# Patient Record
Sex: MALE | Employment: FULL TIME | URBAN - METROPOLITAN AREA
[De-identification: names, ages, dates, MRNs, and addresses within clinical notes are randomized per-mention and may not be internally consistent; named-entity substitution may affect disease eponyms.]

---

## 2017-01-12 ENCOUNTER — APPOINTMENT (OUTPATIENT)
Dept: CT IMAGING | Facility: CLINIC | Age: 42
End: 2017-01-12
Attending: EMERGENCY MEDICINE

## 2017-01-12 ENCOUNTER — APPOINTMENT (OUTPATIENT)
Dept: MRI IMAGING | Facility: CLINIC | Age: 42
End: 2017-01-12
Attending: PHYSICIAN ASSISTANT

## 2017-01-12 ENCOUNTER — APPOINTMENT (OUTPATIENT)
Dept: MRI IMAGING | Facility: CLINIC | Age: 42
End: 2017-01-12
Attending: EMERGENCY MEDICINE

## 2017-01-12 ENCOUNTER — HOSPITAL ENCOUNTER (OUTPATIENT)
Facility: CLINIC | Age: 42
Setting detail: OBSERVATION
Discharge: HOME | End: 2017-01-13
Attending: EMERGENCY MEDICINE | Admitting: INTERNAL MEDICINE

## 2017-01-12 DIAGNOSIS — N18.1 TYPE 2 DIABETES MELLITUS WITH STAGE 1 CHRONIC KIDNEY DISEASE, WITH LONG-TERM CURRENT USE OF INSULIN (H): Primary | ICD-10-CM

## 2017-01-12 DIAGNOSIS — I10 BENIGN ESSENTIAL HYPERTENSION: ICD-10-CM

## 2017-01-12 DIAGNOSIS — R55 SYNCOPE, UNSPECIFIED SYNCOPE TYPE: ICD-10-CM

## 2017-01-12 DIAGNOSIS — E11.22 TYPE 2 DIABETES MELLITUS WITH STAGE 1 CHRONIC KIDNEY DISEASE, WITH LONG-TERM CURRENT USE OF INSULIN (H): Primary | ICD-10-CM

## 2017-01-12 DIAGNOSIS — G45.9 TRANSIENT CEREBRAL ISCHEMIA, UNSPECIFIED TYPE: ICD-10-CM

## 2017-01-12 DIAGNOSIS — R29.898 BILATERAL ARM WEAKNESS: ICD-10-CM

## 2017-01-12 DIAGNOSIS — R07.89 ATYPICAL CHEST PAIN: ICD-10-CM

## 2017-01-12 DIAGNOSIS — Z79.4 TYPE 2 DIABETES MELLITUS WITH STAGE 1 CHRONIC KIDNEY DISEASE, WITH LONG-TERM CURRENT USE OF INSULIN (H): Primary | ICD-10-CM

## 2017-01-12 DIAGNOSIS — R41.0 CONFUSION: ICD-10-CM

## 2017-01-12 DIAGNOSIS — R10.84 ABDOMINAL PAIN, GENERALIZED: ICD-10-CM

## 2017-01-12 DIAGNOSIS — E78.5 HYPERLIPIDEMIA LDL GOAL <100: ICD-10-CM

## 2017-01-12 LAB
ALBUMIN UR-MCNC: NEGATIVE MG/DL
AMMONIA PLAS-SCNC: 38 UMOL/L (ref 10–50)
AMPHETAMINES UR QL SCN: NORMAL
ANION GAP SERPL CALCULATED.3IONS-SCNC: 7 MMOL/L (ref 3–14)
APPEARANCE CSF: CLEAR
APPEARANCE UR: CLEAR
APTT PPP: 29 SEC (ref 22–37)
BILIRUB UR QL STRIP: NEGATIVE
BUN SERPL-MCNC: 17 MG/DL (ref 7–30)
CALCIUM SERPL-MCNC: 8.9 MG/DL (ref 8.5–10.1)
CANNABINOIDS UR QL: NORMAL
CHLORIDE SERPL-SCNC: 107 MMOL/L (ref 94–109)
CO2 SERPL-SCNC: 25 MMOL/L (ref 20–32)
COCAINE UR QL: NORMAL
COLOR CSF: COLORLESS
COLOR UR AUTO: ABNORMAL
CREAT BLD-MCNC: 0.7 MG/DL (ref 0.66–1.25)
CREAT SERPL-MCNC: 0.68 MG/DL (ref 0.66–1.25)
ERYTHROCYTE [DISTWIDTH] IN BLOOD BY AUTOMATED COUNT: 11.9 % (ref 10–15)
ETHANOL SERPL-MCNC: <0.01 G/DL
GFR SERPL CREATININE-BSD FRML MDRD: >90 ML/MIN/1.7M2
GFR SERPL CREATININE-BSD FRML MDRD: ABNORMAL ML/MIN/1.7M2
GLUCOSE BLDC GLUCOMTR-MCNC: 205 MG/DL (ref 70–99)
GLUCOSE BLDC GLUCOMTR-MCNC: 297 MG/DL (ref 70–99)
GLUCOSE CSF-MCNC: 140 MG/DL (ref 40–70)
GLUCOSE SERPL-MCNC: 268 MG/DL (ref 70–99)
GLUCOSE UR STRIP-MCNC: >1000 MG/DL
GRAM STN SPEC: NORMAL
HBA1C MFR BLD: 12 % (ref 4.3–6)
HCT VFR BLD AUTO: 43.6 % (ref 40–53)
HGB BLD-MCNC: 14.8 G/DL (ref 13.3–17.7)
HGB UR QL STRIP: NEGATIVE
INR PPP: 0.95 (ref 0.86–1.14)
INTERPRETATION ECG - MUSE: NORMAL
KETONES UR STRIP-MCNC: NEGATIVE MG/DL
LACTATE BLD-SCNC: 1.2 MMOL/L (ref 0.7–2.1)
LEUKOCYTE ESTERASE UR QL STRIP: NEGATIVE
MCH RBC QN AUTO: 28.9 PG (ref 26.5–33)
MCHC RBC AUTO-ENTMCNC: 33.9 G/DL (ref 31.5–36.5)
MCV RBC AUTO: 85 FL (ref 78–100)
MICRO REPORT STATUS: NORMAL
NITRATE UR QL: NEGATIVE
OPIATES UR QL SCN: NORMAL
PCP UR QL SCN: NORMAL
PH UR STRIP: 6.5 PH (ref 5–7)
PLATELET # BLD AUTO: 272 10E9/L (ref 150–450)
POTASSIUM SERPL-SCNC: 4.4 MMOL/L (ref 3.4–5.3)
PROT CSF-MCNC: 44 MG/DL (ref 15–60)
RBC # BLD AUTO: 5.12 10E12/L (ref 4.4–5.9)
RBC # CSF MANUAL: 0 /UL (ref 0–2)
RBC #/AREA URNS AUTO: 1 /HPF (ref 0–2)
SODIUM SERPL-SCNC: 139 MMOL/L (ref 133–144)
SP GR UR STRIP: 1.01 (ref 1–1.03)
SPECIMEN SOURCE: NORMAL
TROPONIN I BLD-MCNC: 0.02 UG/L (ref 0–0.1)
TROPONIN I SERPL-MCNC: NORMAL UG/L (ref 0–0.04)
TSH SERPL DL<=0.005 MIU/L-ACNC: 2.26 MU/L (ref 0.4–4)
TUBE # CSF: 4 #
URN SPEC COLLECT METH UR: ABNORMAL
UROBILINOGEN UR STRIP-MCNC: NORMAL MG/DL (ref 0–2)
WBC # BLD AUTO: 15.6 10E9/L (ref 4–11)
WBC # CSF MANUAL: 0 /UL (ref 0–5)
WBC #/AREA URNS AUTO: 0 /HPF (ref 0–2)

## 2017-01-12 PROCEDURE — 36415 COLL VENOUS BLD VENIPUNCTURE: CPT

## 2017-01-12 PROCEDURE — 82565 ASSAY OF CREATININE: CPT

## 2017-01-12 PROCEDURE — 00000146 ZZHCL STATISTIC GLUCOSE BY METER IP

## 2017-01-12 PROCEDURE — 89050 BODY FLUID CELL COUNT: CPT | Performed by: EMERGENCY MEDICINE

## 2017-01-12 PROCEDURE — 72125 CT NECK SPINE W/O DYE: CPT

## 2017-01-12 PROCEDURE — 87205 SMEAR GRAM STAIN: CPT | Performed by: EMERGENCY MEDICINE

## 2017-01-12 PROCEDURE — 96360 HYDRATION IV INFUSION INIT: CPT

## 2017-01-12 PROCEDURE — 84484 ASSAY OF TROPONIN QUANT: CPT | Performed by: EMERGENCY MEDICINE

## 2017-01-12 PROCEDURE — 87040 BLOOD CULTURE FOR BACTERIA: CPT | Performed by: EMERGENCY MEDICINE

## 2017-01-12 PROCEDURE — 96361 HYDRATE IV INFUSION ADD-ON: CPT

## 2017-01-12 PROCEDURE — 25000132 ZZH RX MED GY IP 250 OP 250 PS 637: Performed by: PHYSICIAN ASSISTANT

## 2017-01-12 PROCEDURE — 62270 DX LMBR SPI PNXR: CPT

## 2017-01-12 PROCEDURE — 81001 URINALYSIS AUTO W/SCOPE: CPT | Performed by: EMERGENCY MEDICINE

## 2017-01-12 PROCEDURE — 72128 CT CHEST SPINE W/O DYE: CPT

## 2017-01-12 PROCEDURE — 70544 MR ANGIOGRAPHY HEAD W/O DYE: CPT | Mod: XS

## 2017-01-12 PROCEDURE — 84157 ASSAY OF PROTEIN OTHER: CPT | Performed by: EMERGENCY MEDICINE

## 2017-01-12 PROCEDURE — 87800 DETECT AGNT MULT DNA DIREC: CPT | Performed by: EMERGENCY MEDICINE

## 2017-01-12 PROCEDURE — 84443 ASSAY THYROID STIM HORMONE: CPT | Performed by: EMERGENCY MEDICINE

## 2017-01-12 PROCEDURE — 71260 CT THORAX DX C+: CPT

## 2017-01-12 PROCEDURE — 80320 DRUG SCREEN QUANTALCOHOLS: CPT | Performed by: EMERGENCY MEDICINE

## 2017-01-12 PROCEDURE — 25000128 H RX IP 250 OP 636: Performed by: EMERGENCY MEDICINE

## 2017-01-12 PROCEDURE — 99285 EMERGENCY DEPT VISIT HI MDM: CPT | Mod: 25

## 2017-01-12 PROCEDURE — 93005 ELECTROCARDIOGRAM TRACING: CPT

## 2017-01-12 PROCEDURE — 25000128 H RX IP 250 OP 636: Performed by: PHYSICIAN ASSISTANT

## 2017-01-12 PROCEDURE — 80307 DRUG TEST PRSMV CHEM ANLYZR: CPT | Performed by: PHYSICIAN ASSISTANT

## 2017-01-12 PROCEDURE — 84484 ASSAY OF TROPONIN QUANT: CPT

## 2017-01-12 PROCEDURE — 80048 BASIC METABOLIC PNL TOTAL CA: CPT | Performed by: EMERGENCY MEDICINE

## 2017-01-12 PROCEDURE — 83605 ASSAY OF LACTIC ACID: CPT | Performed by: EMERGENCY MEDICINE

## 2017-01-12 PROCEDURE — 36415 COLL VENOUS BLD VENIPUNCTURE: CPT | Performed by: EMERGENCY MEDICINE

## 2017-01-12 PROCEDURE — 25500064 ZZH RX 255 OP 636: Performed by: EMERGENCY MEDICINE

## 2017-01-12 PROCEDURE — 82945 GLUCOSE OTHER FLUID: CPT | Performed by: EMERGENCY MEDICINE

## 2017-01-12 PROCEDURE — 85027 COMPLETE CBC AUTOMATED: CPT | Performed by: EMERGENCY MEDICINE

## 2017-01-12 PROCEDURE — 83036 HEMOGLOBIN GLYCOSYLATED A1C: CPT | Performed by: EMERGENCY MEDICINE

## 2017-01-12 PROCEDURE — 74177 CT ABD & PELVIS W/CONTRAST: CPT

## 2017-01-12 PROCEDURE — 87077 CULTURE AEROBIC IDENTIFY: CPT | Performed by: EMERGENCY MEDICINE

## 2017-01-12 PROCEDURE — A9585 GADOBUTROL INJECTION: HCPCS | Performed by: RADIOLOGY

## 2017-01-12 PROCEDURE — G0378 HOSPITAL OBSERVATION PER HR: HCPCS

## 2017-01-12 PROCEDURE — 85730 THROMBOPLASTIN TIME PARTIAL: CPT | Performed by: EMERGENCY MEDICINE

## 2017-01-12 PROCEDURE — 83036 HEMOGLOBIN GLYCOSYLATED A1C: CPT | Performed by: PHYSICIAN ASSISTANT

## 2017-01-12 PROCEDURE — 99220 ZZC INITIAL OBSERVATION CARE,LEVL III: CPT | Performed by: PHYSICIAN ASSISTANT

## 2017-01-12 PROCEDURE — 72141 MRI NECK SPINE W/O DYE: CPT

## 2017-01-12 PROCEDURE — 87186 SC STD MICRODIL/AGAR DIL: CPT | Performed by: EMERGENCY MEDICINE

## 2017-01-12 PROCEDURE — 72131 CT LUMBAR SPINE W/O DYE: CPT

## 2017-01-12 PROCEDURE — 25500045 ZZH RX 255: Performed by: RADIOLOGY

## 2017-01-12 PROCEDURE — 96372 THER/PROPH/DIAG INJ SC/IM: CPT

## 2017-01-12 PROCEDURE — 70549 MR ANGIOGRAPH NECK W/O&W/DYE: CPT

## 2017-01-12 PROCEDURE — 70450 CT HEAD/BRAIN W/O DYE: CPT

## 2017-01-12 PROCEDURE — 70553 MRI BRAIN STEM W/O & W/DYE: CPT

## 2017-01-12 PROCEDURE — 82140 ASSAY OF AMMONIA: CPT | Performed by: EMERGENCY MEDICINE

## 2017-01-12 PROCEDURE — 87070 CULTURE OTHR SPECIMN AEROBIC: CPT | Performed by: EMERGENCY MEDICINE

## 2017-01-12 PROCEDURE — 85610 PROTHROMBIN TIME: CPT | Performed by: EMERGENCY MEDICINE

## 2017-01-12 RX ORDER — ONDANSETRON 4 MG/1
4 TABLET, ORALLY DISINTEGRATING ORAL EVERY 6 HOURS PRN
Status: DISCONTINUED | OUTPATIENT
Start: 2017-01-12 | End: 2017-01-13 | Stop reason: HOSPADM

## 2017-01-12 RX ORDER — NICOTINE 21 MG/24HR
1 PATCH, TRANSDERMAL 24 HOURS TRANSDERMAL DAILY
Status: DISCONTINUED | OUTPATIENT
Start: 2017-01-12 | End: 2017-01-13 | Stop reason: HOSPADM

## 2017-01-12 RX ORDER — IOPAMIDOL 755 MG/ML
500 INJECTION, SOLUTION INTRAVASCULAR ONCE
Status: COMPLETED | OUTPATIENT
Start: 2017-01-12 | End: 2017-01-12

## 2017-01-12 RX ORDER — NALOXONE HYDROCHLORIDE 0.4 MG/ML
.1-.4 INJECTION, SOLUTION INTRAMUSCULAR; INTRAVENOUS; SUBCUTANEOUS
Status: DISCONTINUED | OUTPATIENT
Start: 2017-01-12 | End: 2017-01-13 | Stop reason: HOSPADM

## 2017-01-12 RX ORDER — ACETAMINOPHEN 325 MG/1
650 TABLET ORAL EVERY 4 HOURS PRN
Status: DISCONTINUED | OUTPATIENT
Start: 2017-01-12 | End: 2017-01-13 | Stop reason: HOSPADM

## 2017-01-12 RX ORDER — LIDOCAINE HYDROCHLORIDE 10 MG/ML
INJECTION, SOLUTION INFILTRATION; PERINEURAL
Status: DISCONTINUED
Start: 2017-01-12 | End: 2017-01-12 | Stop reason: HOSPADM

## 2017-01-12 RX ORDER — NICOTINE POLACRILEX 4 MG
15-30 LOZENGE BUCCAL
Status: DISCONTINUED | OUTPATIENT
Start: 2017-01-12 | End: 2017-01-13

## 2017-01-12 RX ORDER — SODIUM CHLORIDE 9 MG/ML
INJECTION, SOLUTION INTRAVENOUS CONTINUOUS
Status: ACTIVE | OUTPATIENT
Start: 2017-01-12 | End: 2017-01-13

## 2017-01-12 RX ORDER — ATORVASTATIN CALCIUM 20 MG/1
20 TABLET, FILM COATED ORAL DAILY
COMMUNITY
End: 2017-01-13

## 2017-01-12 RX ORDER — DEXTROSE MONOHYDRATE 25 G/50ML
25-50 INJECTION, SOLUTION INTRAVENOUS
Status: DISCONTINUED | OUTPATIENT
Start: 2017-01-12 | End: 2017-01-13

## 2017-01-12 RX ORDER — AMOXICILLIN 250 MG
1-2 CAPSULE ORAL 2 TIMES DAILY
Status: DISCONTINUED | OUTPATIENT
Start: 2017-01-12 | End: 2017-01-13 | Stop reason: HOSPADM

## 2017-01-12 RX ORDER — LANOLIN ALCOHOL/MO/W.PET/CERES
3 CREAM (GRAM) TOPICAL
Status: DISCONTINUED | OUTPATIENT
Start: 2017-01-12 | End: 2017-01-13 | Stop reason: HOSPADM

## 2017-01-12 RX ORDER — ONDANSETRON 2 MG/ML
4 INJECTION INTRAMUSCULAR; INTRAVENOUS EVERY 6 HOURS PRN
Status: DISCONTINUED | OUTPATIENT
Start: 2017-01-12 | End: 2017-01-13 | Stop reason: HOSPADM

## 2017-01-12 RX ORDER — LIDOCAINE 40 MG/G
CREAM TOPICAL
Status: DISCONTINUED | OUTPATIENT
Start: 2017-01-12 | End: 2017-01-12

## 2017-01-12 RX ORDER — ATORVASTATIN CALCIUM 20 MG/1
20 TABLET, FILM COATED ORAL DAILY
Status: DISCONTINUED | OUTPATIENT
Start: 2017-01-13 | End: 2017-01-13 | Stop reason: HOSPADM

## 2017-01-12 RX ORDER — GADOBUTROL 604.72 MG/ML
10 INJECTION INTRAVENOUS ONCE
Status: COMPLETED | OUTPATIENT
Start: 2017-01-12 | End: 2017-01-12

## 2017-01-12 RX ORDER — NICOTINE 21 MG/24HR
1 PATCH, TRANSDERMAL 24 HOURS TRANSDERMAL DAILY
Status: DISCONTINUED | OUTPATIENT
Start: 2017-01-13 | End: 2017-01-12

## 2017-01-12 RX ORDER — NITROGLYCERIN 0.4 MG/1
0.4 TABLET SUBLINGUAL EVERY 5 MIN PRN
Status: DISCONTINUED | OUTPATIENT
Start: 2017-01-12 | End: 2017-01-13 | Stop reason: HOSPADM

## 2017-01-12 RX ADMIN — ACETAMINOPHEN 650 MG: 325 TABLET, FILM COATED ORAL at 18:49

## 2017-01-12 RX ADMIN — INSULIN HUMAN 46 UNITS: 100 INJECTION, SUSPENSION SUBCUTANEOUS at 21:59

## 2017-01-12 RX ADMIN — SODIUM CHLORIDE 1000 ML: 9 INJECTION, SOLUTION INTRAVENOUS at 21:58

## 2017-01-12 RX ADMIN — IOPAMIDOL 90 ML: 755 INJECTION, SOLUTION INTRAVENOUS at 07:15

## 2017-01-12 RX ADMIN — SODIUM CHLORIDE: 9 INJECTION, SOLUTION INTRAVENOUS at 23:48

## 2017-01-12 RX ADMIN — GADOBUTROL 10 ML: 604.72 INJECTION INTRAVENOUS at 19:33

## 2017-01-12 RX ADMIN — NICOTINE 1 PATCH: 21 PATCH, EXTENDED RELEASE TRANSDERMAL at 22:13

## 2017-01-12 RX ADMIN — ASPIRIN 325 MG: 325 TABLET, DELAYED RELEASE ORAL at 18:49

## 2017-01-12 RX ADMIN — SODIUM CHLORIDE 60 ML: 9 INJECTION, SOLUTION INTRAVENOUS at 07:15

## 2017-01-12 ASSESSMENT — ENCOUNTER SYMPTOMS
NUMBNESS: 1
CHILLS: 1
BACK PAIN: 1
NECK PAIN: 1
SHORTNESS OF BREATH: 1
WEAKNESS: 1

## 2017-01-12 NOTE — ED NOTES
OBSERVATION patient IN TIME: 1539  ROOM # 203    Living Situation (if not independent, order SW consult): Ind  Facility name:     Activity level at baseline: Ind  Activity level on admit: SBA    Is patient a falls risk? Yes  Reason for falls risk:  Mobility  Falls armband on? YES  Within Arm's Reach? No.Reason not within arm's reach is: Call appropriately  Bed alarm turned on?   YES  Personal alarm in place and turned on?   Not applicable    Patient registered to observation; given Patient Bill of Rights; given the opportunity to ask questions about observation status and their plan of care.  Patient has been oriented to the observation room, bathroom, and call light is in place.    :

## 2017-01-12 NOTE — ED NOTES
Went with Pt. To CT scan for C-Spine transfer. Brought Pt. Back to room Applied monitoring devices (EKG, BP, and pulse ox) onto Patient. Pt complained of being cold, warm blanket brought to pt.

## 2017-01-12 NOTE — PROGRESS NOTES
Spoke to ER physician.  Pt presents with complicated history this AM and possible Syncopal episode. All spine and brain scans normal.  No NSG intervention needed at this time. Ok to take off collar ok to call with further questions.     Sharon Lua [facility]  [facility]  [address] [place] [address].  [id number]  Tel. [phone]  Fax [phone]

## 2017-01-12 NOTE — ED NOTES
Aspen collar applied by Dr Valdez.  Family members report that pt is less confused than previously.

## 2017-01-12 NOTE — ED AVS SNAPSHOT
[facility] Observation Department    [address]    [facility] [address]    Phone:  [phone]                                       Kris Moeller Sr.   MRN: [id number]    Department:  [facility] Observation Department   Date of Visit:  1/12/2017           After Visit Summary Signature Page     I have received my discharge instructions, and my questions have been answered. I have discussed any challenges I see with this plan with the nurse or doctor.    ..........................................................................................................................................  Patient/Patient Representative Signature      ..........................................................................................................................................  Patient Representative Print Name and Relationship to Patient    ..................................................               ................................................  Date                                            Time    ..........................................................................................................................................  Reviewed by Signature/Title    ...................................................              ..............................................  Date                                                            Time

## 2017-01-12 NOTE — ED PROVIDER NOTES
"  History     Chief Complaint:  Loss of consciousness and chest pain     HPI     History is obtained from the patient, patient's family, and through use of professional Slovak telephone .    Kris Moeller Sr. is a 41 year old male with a past medical history of hypertension and diabetes who presents for multiple complaints. First, the patient states that he had a syncopal episode yesterday at work. He states that he was crouching down while trying to get something off the floor, when he suddenly felt light-headed and passed out. He states that he awoke on the floor, after approximately 1 minute, was able to get himself up and continued on with his day. The patient denies any previous history of syncope. Since the syncopal episode, the patient states that he has felt cold. Second, the patient woke up this morning at 5 AM to go to the restroom, and experienced new onset chest pain and shortness of breath on his way to the bathroom. He describes his chest pain as \"tightness\" and it is located in the left-side of his chest that radiates to his left flank, back and up towards his neck. The patient notes associated extremity weakness, notably to bilateral upper extremities, and less so to his legs.  Denies unilateral weakness.  Notes associated tingling to both his hands and feet, also noting his feet feel cold. The patient's wife called EMS and he was transported to the ED. En route, the patient was given aspirin 324 mg and 1 sublingual nitroglycerin. The patient's chest pain has slightly decreased with these interventions.  On further discussion with the patient, he notes that the morning prior to his syncopal episode, he noted a mild headache, which was different from previous headaches he has experienced in that it felt like his face was \"burning.\"  He works as a cook, and denies any unusual exposures nor foreign substances he may have ingested.  Pt smokes, though denies use of any illicit drugs or consumption " "of alcohol.  He has no prior hx of CAD.  Wife also expresses concern regarding confusion, which she first noted today.  Pt is not oriented to place nor time, and was asking for his grandmother (in [place]).      Cardiac Risk Factors   Sex: Male   Tobacco: Positive  Hypertension: Positive  Diabetes: Positive  Hyperlipidemia: Negative  Family History: Negative    PE/DVT Risk Factors   Personal History: Negative  Recent Travel: Negative   Recent Surgery/Hospitalization: Negative  Tobacco: Positive  Family History: Negative  Hormone Use: Negative   Cancer: Negative  Trauma: Positive    Allergies:  No known drug allergies.     Medications:    Insulin aspart      Past Medical History:    Diabetes  Hypertension     Past Surgical History:    History reviewed. No pertinent past surgical history.    Family History:    History reviewed. No pertinent family history.    Social History:  Marital Status:   Presents to the ED via EMS with his wife and daughter  Tobacco Use: 1 ppd  Alcohol Use: no  PCP: [facility]     Review of Systems   Constitutional: Positive for chills.   Respiratory: Positive for shortness of breath.    Cardiovascular: Positive for chest pain.   Musculoskeletal: Positive for back pain and neck pain.   Neurological: Positive for syncope, weakness and numbness.   All other systems reviewed and are negative.      Physical Exam   First Vitals:  BP: 141/88 mmHg  Pulse: 103  Temp: 98.5  F (36.9  C)  Resp: 18  Height: 165.1 cm (5' 5\")  Weight: 81.647 kg (180 lb)  SpO2: 97 %      Physical Exam  General:              Well-nourished              Speaking in full sentences              Confused to time and place  Head:              No hematoma or step-off              No open wounds  Eyes:              Conjunctiva without injection or scleral icterus              PERRL  ENT:              Moist mucous membranes              Posterior oropharynx clear without erythema or exudate              Nares " patent              Pinnae normal  Neck:              Tenderness to palpation to upper cervical spine   Placed in cervical collar on initial evaluation  Resp:              Lungs CTAB              No crackles, wheezing or audible rubs              Good air movement  CV:                    Normal rate, regular rhythm              S1 and S2 present              No murmur, gallop or rub  GI:              BS present              Abdomen soft without distention              Diffuse mild tenderness to palpation   No palpable masses              No guarding or rebound tenderness  Skin:              Warm, dry, well perfused              No rashes or open wounds on exposed skin  MSK:              Moves all extremities              No focal deformities or swelling              Tenderness to palpation to upper cervical and mid-lumbar spine              No step-off              No open wounds or lesions  Neuro:              Awake, alert, protecting airway   Confusion   CN III-XII grossly intact   4/5  strength bilaterally   Able to lift both arms against gravity   5/5 hip flexion   5/5 ankle dorsi/plantarflexion   Diminished sensation to light touch over arms and legs   Able to stand and bear weight (performed later during ED course)  Psych:              Normal affect, normal mood      Emergency Department Course   ECG:  @ 0620  Indication: Chest pain  Vent. Rate 105 bpm. MS interval 132 ms. QRS duration 70 ms. QT/QTc 314/415 ms. P-R-T axis 36 58 29.   Sinus tachycardia.  Otherwise normal ECG.   Read @ 0624 by Dr. Valdez.    Imaging:  CT Chest/Abdomen/Pelvis  1. No acute abnormality.  2. Urinary bladder is mildly distended which is felt to account for  slight bilateral collecting system prominence  Report per radiology.    CT Thoracic spine w/o contrast  Minimal degenerative changes. No evidence for fracture,  malalignment, or stenosis.  Preliminary radiology read.      Lumbar spine CT w/o contrast  Unremarkable CT of the  lumbar spine.  Report per radiology.    Cervical spine CT w/o contrast   1. No evidence for fracture or malalignment.  2. Ossification of the ligamentum flavum at the T1 and T2 levels.  Preliminary radiology read.      Head CT w/o contrast  Normal CT scan of the head.  Preliminary radiology read.      Cervical spine MRI w/o contrast  Negative cervical spine MRI examination  Report per radiology.    Radiographic findings were communicated with the patient who voiced understanding of the findings.    Laboratory:  CBC:  WBC 15.6 (H), HGB 14.8, , otherwise WNL  BMP: Glucose 268 (H), otherwise WNL (Creatinine 0.68)  Creatinine POCT: 0.7  (0621) Troponin I: <0.015  (0621) INR: 0.95  (0621) PTT: 29  (0621) Alcohol ethyl: <0.01  (0621) Ammonia: 38    (0839) Lactic acid: 1.2    Blood culture x2: pending    UA: Clear light yellow urine, > 1,000 glucose, otherwise WNL    CSF cell count with differential: WNL  Glucose CSF: 140 (H)  Protein CSF: 44  Gram stain: negative, preliminary result   CSF culture, aerobic: pending      Procedure:     Lumbar Puncture         INDICATION:  Confusion      CONSENT:  Risks (including but not limited to; infection, bleeding, spinal headache with possibility of spinal patch and temporary or permanent neurologic injury), benefits and alternatives were discussed with patient and consent for procedure was obtained.    TIMEOUT:  Universal protocol was followed. TIME OUT conducted just prior to starting procedure confirmed patient identity, site/side, procedure, patient position, and availability of correct equipment and implants? Yes      MEDICATIONS:  Lidocaine: Local infiltration    PROCEDURAL NOTE:  Patient was placed in a sitting, supported by bedside stand position.  The low back was prepped with Betadine.  The patient was medicated as above.  A spinal needle was used to gain access to the subarachnoid space with stylet in place.  The fluid was clear.  Stylet was replaced and needle  withdrawn.    PATIENT STATUS:  Patient tolerated the procedure well.  There were no complications.      Emergency Department Course:  Nursing notes and vitals reviewed.  I performed an exam of the patient as documented above. GCS 14.    A peripheral IV was established. Blood was drawn from the patient. This was sent for laboratory testing, findings above. Urine sample was obtained and sent for laboratory analysis, findings above. EKG was done, interpretation as above.    The patient was sent for a CT head, CT chest/abdomen/pelvis, CT thoracic spine, CT lumbar spine, CT cervical spine and cervical spine MRI while in the emergency department, findings above.     (0648) The patient was placed in a cervical collar.  Partial trauma called on initial evaluation.    (0800) I updated the patient and family on lab and imaging results. According to family, he appeared to be doing much better and is more aware. Prior to yesterday, he was feeling normal and denied any fevers.     (0830) Again, the patient states that he is feeling better. He denies chest or abdominal pains.    (0833) We transitioned from hard cervical collar to an Aspen collar.     (1052) I spoke to Dr. Sharon Lua of neurosurgery regarding the patient.  She states C-collar can be removed with above normal imaging findings    (1123) I updated the patient and his family on recent imaging results.    (1143) I obtained consent for performing a lumbar puncture from the patient and reviewed again his history with the assistance of a professional, telephone .    (1158) I performed a lumbar puncture. CSF was sent for analysis.    (1325) I re-checked the patient and he is doing well after the lumbar puncture.    Findings and plan explained to the patient who consents to observation.     (1349) I discussed the patient with SUELLEN Maguire on behalf of Dr. Puckett of the hospitalist service, who will place the patient in observation in the  observation  area.       Impression & Plan       Trauma:  Level of trauma activation: Partial  C-collar and immobilization: applied in ED on initial evaluation.  CSpine Clearance: Negative CT and MRI.  GCS at arrival: 14  GCS at disposition: 15  Full Primary and Secondary survey with appropriate immobilization of spine completed in exam section.  Consults prior to admission or transfer: Dr. Sharon Lua of Neurosurgery  Procedures done in the ED: Lumbar puncture  Medical Decision Making:  Kris Moeller Sr. is a 41 year old male with a history of diabetes and hypertension presenting to the ED accompanied by multiple family members for evaluation of loss of consciousness, confusion, chest pain and back pain. Vital signs on presentation reveal elevated blood pressure and tachycardia, although otherwise are unremarkable. History, examination and ED course as above. At this point, the exact etiology for the patient's above symptoms and history are not entirely clear.  Partial trauma activated given syncopal episode yesterday with concern for traumatic injury (fall, possible head / neck injury?) and objective upper extremity motor weakness. A broad differential was considered including cardiac dysrhythmia, ACS, PE, dissection, pneumothorax, hemorrhage, substance use, withdrawal, spinal cord injury, encephalitis, meningitis, myelopathy, autoimmune disease, metabolic derangement, dehydration, toxin, among others. In light of the patient's presenting complaint of chest pain, back pain and abdominal pain, I strongly considered dissection. The patient appeared quite uncomfortable on exam. Additionally, given his associated confusion and recent syncopal episode, advanced imaging was obtained including CT head, cervical spine, chest/abdomen/pelvis as well as thoracic and lumbar spine. These are negative for findings of acute fracture, dislocation, hemorrhage or acute traumatic findings. Neurologic exam is notable for objective  weakness to  strength of his bilateral upper extremities when compared with his lower extremities. In light of his recent syncopal episode and fall, I considered central cord/spinal cord injury. MRI was obtained of the cervical spine and reveals no acute abnormality. I discussed the case with neurosurgery who felt that in the setting of the above imaging studies, central cord/spinal cord injury was unlikely and cervical collar can be safely removed. Given the patient's confusion on presentation as well as report of headache and subjective chills yesterday evening, I also considered infectious/inflammatory processes within the brain and spine. After informed written consent was obtained through the use of a telephone , LP was performed as above. The patient tolerated this without difficulty. There is fortunately no evidence of hemorrhage or infection and gram stain is negative. By history, the patient denies consumption of any illicit substances nor alcohol. He does smoke regularly. Work-up shows no evidence of anion-gap metabolic acidosis. Ethanol and ammonia levels are undetectable. Urinalysis without evidence of infection. EKG demonstrates sinus tachycardia, although no findings of WPW, Brugada syndrome or prolonged QTc. Additionally, there are no findings of acute ischemia such as ST segment elevation nor depression and troponin is undetectable. The patient was observed in the ED for greater than 6 hours. Clinically, his mental status did improve as did his objective motor weakness to his upper extremities. He himself felt improved as well. Nonetheless, given the above history and examination findings, he will be admitted to the hospitalist for monitoring and further evaluation. Case was discussed with SUELLEN Maguire who has accepted for Dr. Puckett. Questions answered prior to admission.     Critical Care time:  was 40 minutes for this patient excluding procedures.      Diagnosis:    ICD-10-CM     1. Syncope, unspecified syncope type R55 Cell count with differential CSF: Tube 4   2. Confusion R41.0    3. Bilateral arm weakness M62.81    4. Atypical chest pain R07.89    5. Abdominal pain, generalized R10.84        Disposition:  Admitted to Dr. Puckett of the hospitalist service for observation    IMathieu, am serving as a scribe on 1/12/2017 at 6:33 AM to personally document services performed by Dr. Courtney MD based on my observations and the provider's statements to me.     1/12/2017   [facility] EMERGENCY DEPARTMENT        Deon Valdez MD  01/12/17 2090    Deon Valdez MD  01/12/17 2815

## 2017-01-12 NOTE — ED NOTES
Assisted MD during LP procedure Pt. Tolerated well. At end of procedure Pt. Got a little lightheaded so he was laid down. Pt. Feeling better now MD in room during this

## 2017-01-12 NOTE — ED NOTES
Brought in by ems  Woke this am with chest pain and sob this ab  Rated pain 10/10 pain  No hx  Of heart problems but  Is diabetic  On route  Was given asa 324 and 1 nitro  Now pain 3 /`10  Left chest and down into abd    Also c/o  Tingling in finger tips    At work yesterday  Got up to get something  Off ground  And felt off next thing woke up on the ground  Denies cough fever    Here for eval

## 2017-01-12 NOTE — ED AVS SNAPSHOT
MRN:[id number]                      After Visit Summary   1/12/2017    Kris Moeller Sr.    MRN: [id number]           Thank you!     Thank you for choosing [facility] for your care. Our goal is always to provide you with excellent care. Hearing back from our patients is one way we can continue to improve our services. Please take a few minutes to complete the written survey that you may receive in the mail after you visit. If you would like to speak to someone directly about your visit please contact Patient Relations at [phone]. Thank you!          Patient Information     Date Of Birth          1975        About your hospital stay     You were admitted on:  January 12, 2017 You last received care in the:  [facility]    You were discharged on:  January 13, 2017        Reason for your hospital stay       You were admitted for concern of a mini stroke (TIA). CT, MRI and MRA of your head and neck were all negative for stroke. Your heart was monitored overnight and showed no irregular rhythm. You had an echocardiogram done that showed good heart function but you had a small hole in your heart. This could mean absolutely nothing but could be the cause for your stroke. We spoke with cardiology who recommended a baby aspirin only unless you have multiple strokes.    The goal after a TIA is to prevent a future stroke by controlling blood pressure, treating high cholesterol and starting appropriate anticoagulation (blood thinner). Your Cholesterol was checked and we recommend you continue to your cholesterol medication. Your blood pressure was high and we recommend you start a blood pressure medication called Lisinopril. We have started you on Aspirin as a blood thinner. You need to start seeing a primary doctor for your diabetes management. It is not under control. We have refilled your insulin and metformin (don't take metformin until 1/14).     We  "also recommend you be on a heart monitor to look for an irregular heart rhythm. Your primary doctor will discuss these results with you later.    We have placed a consult for you to see a neurologist as well.                  Who to Call     For medical emergencies, please call 911.  For non-urgent questions about your medical care, please call your primary care provider or clinic, [phone]          Attending Provider     Provider    Deon Valdez MD Brown-Switzer, Christa L, DO       Primary Care Provider Office Phone # Fax #    [facility] [phone] [address] [place] [address]        After Care Instructions     Activity       Your activity upon discharge: activity as tolerated            Diet       Follow this diet upon discharge: Regular                  Follow-up Appointments     Follow-up and recommended labs and tests        You MUST find a primary doctor and have follow up for your mini stroke, hypertension, and diabetes. Your diabetes is NOT well controlled.    You also should see a neurologist for your mini stroke.                             Pending Results     Date and Time Order Name Status Description    1/13/2017 1102 Cardiac event monitor In process     1/12/2017 1145 CSF Culture Aerobic Bacterial Preliminary     1/12/2017 0733 Blood culture Preliminary     1/12/2017 0733 Blood culture Preliminary             Statement of Approval     Ordered          01/13/17 1217  I have reviewed and agree with all the recommendations and orders detailed in this document.   EFFECTIVE NOW     Approved and electronically signed by:  Savanna Mcclure PA-C             Admission Information        Provider Department Dept Phone    1/12/2017 Marina Her DO [facility] [phone]      Your Vitals Were     Blood Pressure Pulse Temperature    118/65 mmHg 75 97.9  F (36.6  C) (Oral)    Respirations Height Weight    16 1.651 m (5' 5\") " "81.647 kg (180 lb)    BMI (Body Mass Index) Pulse Oximetry       29.95 kg/m2 94%       MyChart Information     [employer] lets you send messages to your doctor, view your test results, renew your prescriptions, schedule appointments and more. To sign up, go to [email or web address] . Click on \"Log in\" on the left side of the screen, which will take you to the Welcome page. Then click on \"Sign up Now\" on the right side of the page.     You will be asked to enter the access code listed below, as well as some personal information. Please follow the directions to create your username and password.     Your access code is: [email or web address]  Expires: 2017  2:00 PM     Your access code will  in 90 days. If you need help or a new code, please call your [place] clinic or [phone].        Care EveryWhere ID     This is your Care EveryWhere ID. This could be used by other organizations to access your [place] medical records  [id number]           Review of your medicines      START taking        Dose / Directions    aspirin 81 MG EC tablet   Used for:  Transient cerebral ischemia, unspecified type        Dose:  81 mg   Take 1 tablet (81 mg) by mouth daily   Quantity:  90 tablet   Refills:  3       lisinopril 10 MG tablet   Commonly known as:  PRINIVIL/ZESTRIL   Used for:  Benign essential hypertension        Dose:  10 mg   Take 1 tablet (10 mg) by mouth daily   Quantity:  30 tablet   Refills:  0         CONTINUE these medicines which may have CHANGED, or have new prescriptions. If we are uncertain of the size of tablets/capsules you have at home, strength may be listed as something that might have changed.        Dose / Directions    * insulin isophane & regular susp   Commonly known as:  HumuLIN MIX 70/30 PEN   This may have changed:    - how much to take  - when to take this   Used for:  Type 2 diabetes mellitus with stage 1 chronic kidney disease, with long-term current use of insulin (H)        Dose:  23 Units "   Inject 23 Units Subcutaneous every morning   Quantity:  15 mL   Refills:  0       * insulin isophane & regular susp   Commonly known as:  HumuLIN MIX 70/30 PEN   This may have changed:  You were already taking a medication with the same name, and this prescription was added. Make sure you understand how and when to take each.   Used for:  Type 2 diabetes mellitus with stage 1 chronic kidney disease, with long-term current use of insulin (H)        Dose:  26 Units   Inject 26 Units Subcutaneous every evening   Quantity:  15 mL   Refills:  0       * Notice:  This list has 2 medication(s) that are the same as other medications prescribed for you. Read the directions carefully, and ask your doctor or other care provider to review them with you.      CONTINUE these medicines which have NOT CHANGED        Dose / Directions    atorvastatin 20 MG tablet   Commonly known as:  LIPITOR   Used for:  Hyperlipidemia LDL goal <100        Dose:  20 mg   Take 1 tablet (20 mg) by mouth daily   Quantity:  30 tablet   Refills:  0       metFORMIN 500 MG tablet   Commonly known as:  GLUCOPHAGE   Used for:  Type 2 diabetes mellitus with stage 1 chronic kidney disease, with long-term current use of insulin (H)        Dose:  1000 mg   Start taking on:  1/14/2017   Take 2 tablets (1,000 mg) by mouth 2 times daily (with meals)   Quantity:  120 tablet   Refills:  0            Where to get your medicines      These medications were sent to [facility] [place] [id number] [place] [id number]     Phone:  [phone]    - atorvastatin 20 MG tablet  - insulin isophane & regular susp  - insulin isophane & regular susp  - lisinopril 10 MG tablet  - metFORMIN 500 MG tablet      Some of these will need a paper prescription and others can be bought over the counter. Ask your nurse if you have questions.     You don't need a prescription for these medications    - aspirin 81 MG EC tablet              Protect others around you: Learn how to safely use, store and throw away your medicines at www.disposemymeds.org.             Medication List: This is a list of all your medications and when to take them. Check marks below indicate your daily home schedule. Keep this list as a reference.      Medications           Morning Afternoon Evening Bedtime As Needed    aspirin 81 MG EC tablet   Take 1 tablet (81 mg) by mouth daily   Last time this was given:  325 mg on 1/13/2017  8:34 AM                                atorvastatin 20 MG tablet   Commonly known as:  LIPITOR   Take 1 tablet (20 mg) by mouth daily   Last time this was given:  20 mg on 1/13/2017  8:33 AM                                * insulin isophane & regular susp   Commonly known as:  HumuLIN MIX 70/30 PEN   Inject 23 Units Subcutaneous every morning   Last time this was given:  23 Units on 1/13/2017  8:48 AM                                * insulin isophane & regular susp   Commonly known as:  HumuLIN MIX 70/30 PEN   Inject 26 Units Subcutaneous every evening   Last time this was given:  23 Units on 1/13/2017  8:48 AM                                lisinopril 10 MG tablet   Commonly known as:  PRINIVIL/ZESTRIL   Take 1 tablet (10 mg) by mouth daily   Last time this was given:  10 mg on 1/13/2017  8:47 AM                                metFORMIN 500 MG tablet   Commonly known as:  GLUCOPHAGE   Take 2 tablets (1,000 mg) by mouth 2 times daily (with meals)   Start taking on:  1/14/2017                                * Notice:  This list has 2 medication(s) that are the same as other medications prescribed for you. Read the directions carefully, and ask your doctor or other care provider to review them with you.              More Information        AIT:Ataque Isquémico Transitorio [TIA: Transient Ischemic Attack]    El ataque que ha tenido hoy se conoce hailey ataque isquémico transitorio (AIT) y es leon especie de  mini-ataque  cerebral causado por  leon reducción temporal del flujo sanguíneo a leon parte del cerebro. El AIT suele ocurrir cuando un coágulo de miladys se desplaza hasta el cerebro. El coágulo bloquea el flujo sanguíneo y causa los síntomas que usted ha tenido. Después de un breve período el coágulo se disuelve, la miladys vuelve a fluir y los síntomas desaparecen. Las personas con aterosclerosis (endurecimiento de las arterias) y con fibrilación auricular (un tipo de latido irregular del corazón) tienen un riesgo mayor de AIT y de ataque cerebral.  El AIT provoca síntomas transitorios similares a los del ataque cerebral, rosette dura menos de 24 horas. Un ataque cerebral causaría síntomas darya más de 24 horas que podrían ser permanentes. Leon vez que ha tenido un AIT usted corre el riesgo de tener un ataque cerebral completo. Por lo tanto, asegúrese de programar leon visita de control con hudson médico para recibir evaluación y tratamiento adicionales. [place] puede incluir leon ecografía de las arterias del kerline y leon evaluación del corazón. Si se encuentran problemas, hudson médico le recomendará un tratamiento con medicamentos y/o procedimientos.  Los medicamentos que reducen sandy posibilidades de otro AIT (y un ataque cerebral) incluyen los que previenen los coágulos de miladys, tales hailey los antiplaquetarios (aspirina y Plavix) y los anticoagulantes (hailey la warfarina).  Cuidados En La Dow:    Si todos sandy síntomas se sabillon aliviado, no necesita hacer nada especial hoy. Descanse en casa y evite los esfuerzos y actividades fatigosas darya el macy del día.    Si el médico le ha recetado medicamentos antiplaquetarios, tómelos según las instrucciones.  Otras Formas De Reducir El Riesgo De Ataque Cerebral  La hipertensión, la diabetes, el colesterol alto y el tabaco son factores de riesgo para el ataque cerebral y las enfermedades cardíacas. Estos factores pueden controlarse mediante tratamiento médico y cambios en la dieta y el estilo de radha. Omero leon  aspirina diaria (o un medicamento similar) es leon medida sencilla rosette importante para prevenir los ataques cerebrales.  Visitas De Control: Programe leon ed con hudson médico en los próximos días para que le maureen otro examen. Valentino vez sea necesario que le maureen pruebas adicionales. Si le hicieron radiografías (yoly X), leon tomografía computarizada (CT scan), un estudio de imágenes por resonancia magnética (MRI) o un electrocardiograma (ECG), concepcion será revisado por un especialista. Le notificaremos si se encuentra algo que afecte hudson atención médica.  Llame Al 911 O A Los Servicios De Emergencia  si algo de lo siguiente ocurre:    Reaparición de cualquiera de los síntomas de AIT    Nuevos problemas para hablar, tank, caminar o falta de sensibilidad o debilidad en la tara o en un lado del cuerpo    Dolor de michael intenso, desmayos, mareos o convulsiones    [id number] [employer] [place]. [address] [place] [id number]. Todos los derechos reservados. Esta información no pretende sustituir la atención médica profesional. Sólo hudson médico puede diagnosticar y tratar un problema de giovany.

## 2017-01-12 NOTE — PHARMACY-ADMISSION MEDICATION HISTORY
Admission medication history interview status for this patient is complete. See [facility] admission navigator for allergy information, prior to admission medications and immunization status.     Medication history interview source(s):Patient's wife & family  Medication history resources (including written lists, pill bottles, clinic record): Verified meds w/ [facility]  Primary pharmacy: [facility], [place]    Changes made to PTA medication list:  Added: metformin  Deleted: none  Changed: insulin aspart --> humulin 70/30, unknown cholesterol med --> atorvastatin    Actions taken by pharmacist (provider contacted, etc):None     Additional medication history information:None    Medication reconciliation/reorder completed by provider prior to medication history? No        For patients on insulin therapy: Yes  70/30: 46 units BID  Sliding scale Novolog N   Patients eat three meals a day:   Y     Any Barriers to therapy:  none      Prior to Admission medications    Medication Sig Last Dose Taking? Auth Provider   insulin isophane & regular (HUMULIN MIX 70/30 PEN) susp Inject 46 Units Subcutaneous 2 times daily 1/11/2017 at pm Yes Unknown, Entered By History   metFORMIN (GLUCOPHAGE) 500 MG tablet Take 1,000 mg by mouth 2 times daily (with meals) 1/11/2017 at pm Yes Unknown, Entered By History   atorvastatin (LIPITOR) 20 MG tablet Take 20 mg by mouth daily 1/11/2017 at am Yes Unknown, Entered By History

## 2017-01-13 ENCOUNTER — APPOINTMENT (OUTPATIENT)
Dept: CARDIOLOGY | Facility: CLINIC | Age: 42
End: 2017-01-13
Attending: PHYSICIAN ASSISTANT

## 2017-01-13 VITALS
HEIGHT: 65 IN | HEART RATE: 75 BPM | DIASTOLIC BLOOD PRESSURE: 65 MMHG | OXYGEN SATURATION: 94 % | BODY MASS INDEX: 29.99 KG/M2 | TEMPERATURE: 97.9 F | SYSTOLIC BLOOD PRESSURE: 118 MMHG | RESPIRATION RATE: 16 BRPM | WEIGHT: 180 LBS

## 2017-01-13 LAB
ANION GAP SERPL CALCULATED.3IONS-SCNC: 9 MMOL/L (ref 3–14)
BASOPHILS # BLD AUTO: 0 10E9/L (ref 0–0.2)
BASOPHILS NFR BLD AUTO: 0.4 %
BUN SERPL-MCNC: 15 MG/DL (ref 7–30)
CALCIUM SERPL-MCNC: 8.1 MG/DL (ref 8.5–10.1)
CHLORIDE SERPL-SCNC: 111 MMOL/L (ref 94–109)
CHOLEST SERPL-MCNC: 195 MG/DL
CO2 SERPL-SCNC: 24 MMOL/L (ref 20–32)
CREAT SERPL-MCNC: 0.71 MG/DL (ref 0.66–1.25)
DIFFERENTIAL METHOD BLD: ABNORMAL
EOSINOPHIL # BLD AUTO: 0.2 10E9/L (ref 0–0.7)
EOSINOPHIL NFR BLD AUTO: 1.8 %
ERYTHROCYTE [DISTWIDTH] IN BLOOD BY AUTOMATED COUNT: 12 % (ref 10–15)
GFR SERPL CREATININE-BSD FRML MDRD: ABNORMAL ML/MIN/1.7M2
GLUCOSE BLDC GLUCOMTR-MCNC: 119 MG/DL (ref 70–99)
GLUCOSE BLDC GLUCOMTR-MCNC: 160 MG/DL (ref 70–99)
GLUCOSE BLDC GLUCOMTR-MCNC: 216 MG/DL (ref 70–99)
GLUCOSE SERPL-MCNC: 117 MG/DL (ref 70–99)
HCT VFR BLD AUTO: 38.3 % (ref 40–53)
HDLC SERPL-MCNC: 38 MG/DL
HGB BLD-MCNC: 13.1 G/DL (ref 13.3–17.7)
IMM GRANULOCYTES # BLD: 0 10E9/L (ref 0–0.4)
IMM GRANULOCYTES NFR BLD: 0.4 %
LDLC SERPL CALC-MCNC: 108 MG/DL
LYMPHOCYTES # BLD AUTO: 3.2 10E9/L (ref 0.8–5.3)
LYMPHOCYTES NFR BLD AUTO: 30.3 %
MCH RBC QN AUTO: 29.3 PG (ref 26.5–33)
MCHC RBC AUTO-ENTMCNC: 34.2 G/DL (ref 31.5–36.5)
MCV RBC AUTO: 86 FL (ref 78–100)
MONOCYTES # BLD AUTO: 0.7 10E9/L (ref 0–1.3)
MONOCYTES NFR BLD AUTO: 7 %
NEUTROPHILS # BLD AUTO: 6.3 10E9/L (ref 1.6–8.3)
NEUTROPHILS NFR BLD AUTO: 60.1 %
NONHDLC SERPL-MCNC: 157 MG/DL
NRBC # BLD AUTO: 0 10*3/UL
NRBC BLD AUTO-RTO: 0 /100
PLATELET # BLD AUTO: 267 10E9/L (ref 150–450)
POTASSIUM SERPL-SCNC: 3.5 MMOL/L (ref 3.4–5.3)
RBC # BLD AUTO: 4.47 10E12/L (ref 4.4–5.9)
SODIUM SERPL-SCNC: 144 MMOL/L (ref 133–144)
TRIGL SERPL-MCNC: 244 MG/DL
WBC # BLD AUTO: 10.6 10E9/L (ref 4–11)

## 2017-01-13 PROCEDURE — 93306 TTE W/DOPPLER COMPLETE: CPT | Mod: 26 | Performed by: INTERNAL MEDICINE

## 2017-01-13 PROCEDURE — 80048 BASIC METABOLIC PNL TOTAL CA: CPT | Performed by: PHYSICIAN ASSISTANT

## 2017-01-13 PROCEDURE — 40000264 ECHO COMPLETE BUBBLE STUDY WITH OPTISON

## 2017-01-13 PROCEDURE — 25500064 ZZH RX 255 OP 636: Performed by: INTERNAL MEDICINE

## 2017-01-13 PROCEDURE — 96361 HYDRATE IV INFUSION ADD-ON: CPT

## 2017-01-13 PROCEDURE — 93272 ECG/REVIEW INTERPRET ONLY: CPT | Performed by: INTERNAL MEDICINE

## 2017-01-13 PROCEDURE — 85025 COMPLETE CBC W/AUTO DIFF WBC: CPT | Performed by: PHYSICIAN ASSISTANT

## 2017-01-13 PROCEDURE — 00000146 ZZHCL STATISTIC GLUCOSE BY METER IP

## 2017-01-13 PROCEDURE — 36415 COLL VENOUS BLD VENIPUNCTURE: CPT | Performed by: PHYSICIAN ASSISTANT

## 2017-01-13 PROCEDURE — 40000275 ZZH STATISTIC RCP TIME EA 10 MIN

## 2017-01-13 PROCEDURE — 25000132 ZZH RX MED GY IP 250 OP 250 PS 637: Performed by: PHYSICIAN ASSISTANT

## 2017-01-13 PROCEDURE — 80061 LIPID PANEL: CPT | Performed by: PHYSICIAN ASSISTANT

## 2017-01-13 PROCEDURE — G0378 HOSPITAL OBSERVATION PER HR: HCPCS

## 2017-01-13 PROCEDURE — 93005 ELECTROCARDIOGRAM TRACING: CPT

## 2017-01-13 PROCEDURE — 93010 ELECTROCARDIOGRAM REPORT: CPT | Performed by: INTERNAL MEDICINE

## 2017-01-13 PROCEDURE — 93270 REMOTE 30 DAY ECG REV/REPORT: CPT | Performed by: PHYSICIAN ASSISTANT

## 2017-01-13 PROCEDURE — 25000132 ZZH RX MED GY IP 250 OP 250 PS 637: Performed by: INTERNAL MEDICINE

## 2017-01-13 PROCEDURE — 99217 ZZC OBSERVATION CARE DISCHARGE: CPT | Performed by: PHYSICIAN ASSISTANT

## 2017-01-13 PROCEDURE — 96372 THER/PROPH/DIAG INJ SC/IM: CPT

## 2017-01-13 RX ORDER — MAGNESIUM HYDROXIDE 1200 MG/15ML
30 LIQUID ORAL ONCE
Status: DISCONTINUED | OUTPATIENT
Start: 2017-01-13 | End: 2017-01-13 | Stop reason: HOSPADM

## 2017-01-13 RX ORDER — NICOTINE POLACRILEX 4 MG
15-30 LOZENGE BUCCAL
Status: DISCONTINUED | OUTPATIENT
Start: 2017-01-13 | End: 2017-01-13 | Stop reason: HOSPADM

## 2017-01-13 RX ORDER — LISINOPRIL 10 MG/1
10 TABLET ORAL DAILY
Qty: 30 TABLET | Refills: 0 | Status: ON HOLD | OUTPATIENT
Start: 2017-01-13 | End: 2020-04-07

## 2017-01-13 RX ORDER — DEXTROSE MONOHYDRATE 25 G/50ML
25-50 INJECTION, SOLUTION INTRAVENOUS
Status: DISCONTINUED | OUTPATIENT
Start: 2017-01-13 | End: 2017-01-13 | Stop reason: HOSPADM

## 2017-01-13 RX ORDER — ATORVASTATIN CALCIUM 20 MG/1
20 TABLET, FILM COATED ORAL DAILY
Qty: 30 TABLET | Refills: 0 | Status: ON HOLD | OUTPATIENT
Start: 2017-01-13 | End: 2020-04-07

## 2017-01-13 RX ORDER — LISINOPRIL 10 MG/1
10 TABLET ORAL DAILY
Status: DISCONTINUED | OUTPATIENT
Start: 2017-01-13 | End: 2017-01-13 | Stop reason: HOSPADM

## 2017-01-13 RX ADMIN — SENNOSIDES AND DOCUSATE SODIUM 1 TABLET: 8.6; 5 TABLET ORAL at 08:33

## 2017-01-13 RX ADMIN — ATORVASTATIN CALCIUM 20 MG: 20 TABLET, FILM COATED ORAL at 08:33

## 2017-01-13 RX ADMIN — ASPIRIN 325 MG: 325 TABLET, DELAYED RELEASE ORAL at 08:34

## 2017-01-13 RX ADMIN — MELATONIN TAB 3 MG 3 MG: 3 TAB at 00:00

## 2017-01-13 RX ADMIN — HUMAN ALBUMIN MICROSPHERES AND PERFLUTREN 3 ML: 10; .22 INJECTION, SOLUTION INTRAVENOUS at 08:08

## 2017-01-13 RX ADMIN — NICOTINE 1 PATCH: 21 PATCH, EXTENDED RELEASE TRANSDERMAL at 08:34

## 2017-01-13 RX ADMIN — LISINOPRIL 10 MG: 10 TABLET ORAL at 08:47

## 2017-01-13 NOTE — ED NOTES
"PRIMARY DIAGNOSIS: TIA   OUTPATIENT/OBSERVATION GOALS TO BE MET BEFORE DISCHARGE:   1. Diagnostic testing complete & WNL: Yes  2. Cleared by neurology consultant (if involved): No   3. Patient at neurological baseline: No   4. ADLs back to baseline? No   5. Activity and level of assistance: Up with standby assistance.   6. Pain status: Pain free.   7. Barriers to discharge noted No   8. Interpretation of rhythm per telemetry tech: SR  VSS, pt being ruled out for TIA. Pt has echo with bubble study ordered for tomorrow AM. Pt was unable to tell me what his birthday was during neuro check x2. Pt also states he has some tingling in his fingertips and his face \"feels funny\". Strength was 4/5 on left side and seemed forced compared to right side. Pt is orthostatic positive from sitting to standing. Nicotine patch placed on left arm. Pt denies CP or SOB at time of assessment. Pt up ambulating around unit x1. Pt states headache is resolved after PRN tylenol. Pt is SBA with transfers and ambulation. Pt denies difficulty swallowing. Will continue to monitor and provide supportive cares.    "

## 2017-01-13 NOTE — H&P
"PRIMARY CARE PROVIDER:  Through [facility] although he does not follow them as he does not have any insurance coverage        CHIEF COMPLAINT:  Syncopal episode followed by confusion.      HISTORY OF PRESENT ILLNESS:  Mr. Kris Moeller is a 41-year-old [place]-speaking gentleman who comes into the emergency room with concerns of recent syncopal episode as well as new onset of confusion.  History is obtained by speaking with the wife as well as the son who is helping with translation, a  is currently pending.  History also is obtained by speaking with the ED physician.  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  He describes waking up pretty much immediately after he hit the floor and then he was a little bit confused as to why he was on the floor but was able to get up and went about his day and ultimately going home around 8 o'clock in the evening.  He does have a history of diabetes, he takes Insulin NPH twice a day.  He admits to not taking his blood sugars on a daily basis but his wife states that he has had previous episodes of hyperglycemia in the 300s and episode of hypoglycemia as low as 60s but that was some time ago.  He does describe that he knows when he is hypoglycemic characterized by sweatiness and shakiness.  Yesterday he states he cannot remember whether he had lunch or not but he does endorse taking insulin in the morning.  He did not feel diaphoretic prior to the episode but he did feel somewhat shaky.  Nonetheless when he went home at 8 o'clock his wife felt that he was pretty normal, they had dinner and then he went to bed.  Around 2 o'clock in the morning he had woken up with complaints of feeling cold, he also complained of chest pain and shortness of breath.  He also " "complains to numbness and tingling on the right side of his arms and legs as well as some mild facial discomfort where the wife describes it as some facial burning sensation.  The wife also described that it appeared that he had a little bit of trouble talking in that his mouth on the right side was moving in somewhat of a \"funny way\" and appear to be a little bit droopy.  When his brother arrived in the emergency room the patient was obviously confused, he did not know why he was in the ER he thought that his brother came to get him for work.  His brother also endorsed that his face appeared to be somewhat droopy.  Currently he states that he feels somewhat better.  He still has a bit of a hard time with decreased sensation of his right side of his face, he also feels some numbness, tingling on the left leg.      EMS report was also reviewed, his blood pressure was elevated at the time in the 180s/100, his blood sugar was 228 when EMS arrived.      Upon arrival to the emergency room his vital signs were fairly stable.        LABORATORY RESULTS:  Was positive for elevated hemoglobin A1c of 12.0, his troponin was unremarkable.  His white count was elevated at 15.6 thousand.  Urinalysis showed no obvious infection except for gross glucose spillage. His ethanol level was negative as well as a negative urine tox scan.  He underwent a fairly thorough and radiologic evaluation included CT scan of the head that was negative, CT scan of the thoracic spine showed no fractures, CT of the cervical spine was also negative as well as MRI of the cervical spine.  He underwent subsequent CT scan of the chest, abdomen and pelvis which also was unremarkable.  CT scan of the lumbar spine was also unremarkable as well.  Due to his confusion an LP was performed with negative preliminary results with no WBC and no organisms seen on the Gram stain.  His glucose was elevated at 140 but otherwise everything else was unremarkable.  He was " recommended for admission to the hospital for further evaluation of his acute mental status change in the setting of a recent syncopal episode.      PAST MEDICAL HISTORY:   1.  Diabetes mellitus on insulin.     2.  Hypertension not on any meds.     3.  Hyperlipidemia.     4.  Previous history of MRSA abscess of the back.     5.  He is ongoing tobacco abuser.      MEDICATIONS:  Prior to arrival includes;    1.  Humulin mix 70/30, he tells me he is on 23 units in the morning and 26 units in the evening.   2.  Metformin 1000 mg p.o. b.i.d.    3.  Atorvastatin 20 mg p.o. daily.      ALLERGIES TO MEDICATIONS:  No known drug allergy.      FAMILY HISTORY:  Reviewed, he states that there is a history of hypertension and diabetes in the family not certain whether there is a history of stroke or not.      SOCIAL HISTORY:  He is , he is accompanied by his wife and he has a son currently here along with 2 brothers.  He works as a .  He is a 2-pack per day smoker for at least 20 years.  He is occasional alcohol user.  He is also uninsured.      REVIEW OF SYSTEMS:  Negative for any recent fevers or chills or nausea, no vomiting, no recent illness that he describes or he can remember prior to this syncopal episode.  He has no previous history of syncope in the past.  Otherwise 12-point system reviewed and are all negative beyond those stated in HPI.      PHYSICAL EXAMINATION:   VITAL SIGNS:  T-max of 98.3 with a heart rate of 92, blood pressure 141/82, respirations 18 and saturating 97% on room air.   GENERAL APPEARANCE:  The patient is alert to person, place and time.  He got his birthday incorrectly.  He knows who the president was and the upcoming president are.   HEENT:  Pupils are round, react to light, EOMs are intact, sclerae is nonicteric, conjunctiva is pink.  Oral mucosa is pink and moist.   NECK:  Supple with no cervical lymphadenopathy or thyromegaly, trachea is midline.   CARDIAC:  Exam is regular rate and  rhythm, normal S1, S2 with no murmur, rubs or gallops appreciated.   PULMONARY:  Exam is clear to auscultation bilateral, no wheezing, rales, rhonchi, no use of accessory muscles or intercostal retraction.   ABDOMEN:  Bowel sounds are present, soft, nontender, nondistended, no hepatomegaly.   EXTREMITIES:  Reveals no clubbing, cyanosis or edema.   NEURO EXAM:  His EOMs are intact, his tongue protrudes forward however, there is slight grimace when he attempts to smile, he does have a very mild right-sided facial droop which is confirmed by his family.  He also has decreased sensation of the right side of his face.  Strength- wise he has probably 4/5 strength on the right arm versus 5/5, his sensation is intact distally.  He is finger-to-nose is appropriate.  He has a negative pronator drift.  His tongue protrudes forward and does not deviate.  There is no nystagmus.  Lower extremity has 5/5 strength.  He does complain of decreased sensation on the left side of his legs, however.   SKIN:  Warm and dry with no evidence of rash or lesions.   PSYCH:  Mood and affect are appropriate.      Laboratory result again as summarized above, most recent blood glucose is at 205, former lactic acid obtained is also negative.  EKG personally reviewed by myself shows sinus tachycardia but otherwise no ischemic changes.      ASSESSMENT AND PLAN:  Mr. Kris Moeller is a 41-year-old gentleman with a history of hypertension, hyperlipidemia, insulin-dependent diabetic who presents to the emergency room after really for onset of acute mental status change in the setting of recent syncopal episode.  He has had extensive evaluation in the emergency room for partial trauma, he has no fractures, no acute infections, no acute electrolyte abnormality as a cause for his confusion or syncope.  He is being admitted to the hospital for further evaluation and workup.   1.  Altered mental status/confusion --  Per family his mental apparently is improving,  "when he first came to the emergency room he did not get the year right and now he knows, however, he could not tell correctly his birthday.  Given his history and multiple risk factors for vascular disease as well as a physical exam concerning for persisting confusion, right-sided facial droop and right-sided weakness, I am suspicious of a TIA or a CVA as the cause of his confusion.  He certainly does have multiple risk factors for this as such I will get an MRI of the brain and MRA of the brain and neck as well.  He will be started on a full dose aspirin; he will also be placed on telemetry an echo with bubble will be ordered tomorrow to complete TIA/CVA evaluation.   2.  Syncopal episode -- His syncopal episode sound somewhat vasovagal to me especially in the setting of positional changes, sensation of lightheadedness, dizziness followed by seeing \"black in the eyes.\"  Certainly could be related to blood sugar although we do not know at that time if he was hypoglycemic or significantly hyperglycemic.  Obviously his diabetes is not well controlled with A1c of 12.  I will place him on telemetry to rule out for potential dysrhythmia, an echo will be done tomorrow to rule out for potential structural heart disease, I will also to a set of orthostatics to see if dehydration is a factor.   3.  Diabetes mellitus:  Recent A1c shows it is 12.  Previously in his records he was on 46 units of 70/30 b.i.d.  He is currently on 23 and 26; we will look into his most recent visit through [facility] to see if there is a reason for this change.  Obviously he needs to improve his diabetic control.  For now I will keep him on his usual dose of 23 units in the morning, 26 units in the evening and place him on a high resistance sliding scale to assess where his needs really are and likely will need to adjust his NPH dosing appropriately at discharge.  We will hold his metformin given his recent contrast exposure.   4.  " Hyperlipidemia.  Resume statin.  He will be checked for a fasting lipid in the morning.   5.  Hypertension.  Blood pressure here in the emergency room has been somewhat elevated in the 130s to 160s.  I will allow for some mild permissive hypertension if did have a stroke but ultimately he needs to have better medication management such as an ACE or an ARB.  This likely can be started prior to discharge.   6.  Disposition -- Pending the results of the studies tomorrow I suspect he will likely be able to go home.  He does have some concerns for finances especially given that he is uninsured, I have requested a financial services to come see him to help him with this concern.     7.  He is admitted to the observation unit and expect he will discharge tomorrow.    8.   DVT (deep venous thrombosis) prophylaxis.  The patient will be placed on SCDs for now.         SACHI FERNANDEZ DO       As dictated by SUELLEN WITT            D: 2017 18:26   T: 2017 20:06   MT: [id number]      Name:     DANIELA JOSEPH   MRN:      [id number]        Account:      [id number]   :      1975           Admitted:     [id number]      Document: [id number]

## 2017-01-13 NOTE — PROGRESS NOTES
Hosp addendum:    MRI/MRA of the brain and neck is negative for acute CVA.   His MS seems to be improving. Now knows his bday correctly.   Given all his c/o earlier today (confusion, right sided lip droop, slurred speech and mild RUE weakness), and in addition to his vascular risk factors, I have high suspicion that this is c/w TIA.   Recommend to cont ASA, statin, lisinopril.     DM management: Med rec shows 46u BID NPH but upon asking pt, he is on 23u in am and 26u in pm. However, he received 46u NPH already this evening. A1c still shows poor control at 12.   Will change him to his usual home dose for now and assess how much additional insulin he requires. But will likely need to increase his maintenance dose. Needs f./u with PCP.

## 2017-01-13 NOTE — ED NOTES
"PRIMARY DIAGNOSIS: TIA  OUTPATIENT/OBSERVATION GOALS TO BE MET BEFORE DISCHARGE:    1. Diagnostic testing complete & WNL: MRA to be completed  2. Cleared by neurology consultant (if involved): No  3. Patient at neurological baseline: No  4. ADLs back to baseline?  No  5. Activity and level of assistance: Up with standby assistance.  6. Pain status: Pain free.  7. Barriers to discharge noted No  8. Interpretation of rhythm per telemetry tech:    VSS, pt being ruled out for TIA. Pt was unable to tell me what his birthday was during neuro check. Pt also states he has some tingling in his fingertips and his face \"feels funny\". Strength was 4/5 on left side and seemed forced compared to right side. Pt denies CP or SOB at time of assessment. Pt also denies pain other than a very mild head pain. Pt is SBA with transfers and ambulation. Pt denies difficulty swallowing.   "

## 2017-01-13 NOTE — PROGRESS NOTES
PRIMARY DIAGNOSIS: TIA  OUTPATIENT/OBSERVATION GOALS TO BE MET BEFORE DISCHARGE:    1. Diagnostic testing complete & WNL: Yes    2. Cleared by neurology consultant (if involved): No  3. Patient at neurological baseline: No - face droop    4. ADLs back to baseline?  No  5. Activity and level of assistance: Up with standby assistance.  6. Pain status: Pt reports low back pain 8/10  7. Barriers to discharge noted No  8. Interpretation of rhythm per telemetry tech: SB with ST elevation. This info passed along to next RN to inform PA when she returns to unit

## 2017-01-14 NOTE — PROGRESS NOTES
"Contacted by ED HUC, updated by microlab technician regarding positive blood culture result; gram positive cocci in clusters on LUE, RUE blood cx negative. Update that varigene culture resulted as \"no identification\" which means it was not one of the more common contaminants usually tested for.  They will update us regarding the final results of the culture when they return.    Patient admitted under observation for TIA; patient afebrile, no leukocytosis, VSS. Suspect contaminant, no intervention indicated at this time.  "

## 2017-01-14 NOTE — PROGRESS NOTES
Paged by ED charge nurse, informed of positive blood culture result; gram positive cocci in clusters on LUE, RUE blood cx negative. Patient admitted under observation for TIA; patient afebrile, no leukocytosis, VSS. Suspect contaminant, no intervention indicated at this time.

## 2017-01-14 NOTE — ED NOTES
Addendum took a call from Microbiology lab at the [facility] on 1-14-17 at 1340 stating that pt had a positive blood culture result, blood that was drawn from left arm on 1-12-17 showed gram positive cocci in clusters, this information was called to Cherri KEN on the observation unit that pt had been admitted to from the ER.

## 2017-01-16 LAB
BACTERIA SPEC CULT: ABNORMAL
INTERPRETATION ECG - MUSE: NORMAL
Lab: ABNORMAL
MICRO REPORT STATUS: ABNORMAL
MICROORGANISM SPEC CULT: ABNORMAL
SPECIMEN SOURCE: ABNORMAL

## 2017-01-17 LAB
BACTERIA SPEC CULT: NO GROWTH
MICRO REPORT STATUS: NORMAL
SPECIMEN SOURCE: NORMAL

## 2017-01-18 LAB
BACTERIA SPEC CULT: NO GROWTH
Lab: NORMAL
MICRO REPORT STATUS: NORMAL
SPECIMEN SOURCE: NORMAL

## 2017-06-13 NOTE — ED NOTES
PRIMARY DIAGNOSIS: TIA  OUTPATIENT/OBSERVATION GOALS TO BE MET BEFORE DISCHARGE:    1. Diagnostic testing complete & WNL: MRI/MRA done, echo to be done   2. Cleared by neurology consultant (if involved): No  3. Patient at neurological baseline: No- face droop, right side weakness   4. ADLs back to baseline?  No  5. Activity and level of assistance: Up with standby assistance.  6. Pain status: Pain free.  7. Barriers to discharge noted No  8. Interpretation of rhythm per telemetry tech: Normal sinus in 70s       Take over-the-counter co-Q10 daily with your statin drug.

## 2017-10-03 ENCOUNTER — APPOINTMENT (OUTPATIENT)
Dept: MRI IMAGING | Facility: CLINIC | Age: 42
End: 2017-10-03
Attending: EMERGENCY MEDICINE

## 2017-10-03 ENCOUNTER — HOSPITAL ENCOUNTER (EMERGENCY)
Facility: CLINIC | Age: 42
Discharge: HOME | End: 2017-10-03
Attending: EMERGENCY MEDICINE | Admitting: EMERGENCY MEDICINE

## 2017-10-03 VITALS
WEIGHT: 188 LBS | DIASTOLIC BLOOD PRESSURE: 75 MMHG | RESPIRATION RATE: 16 BRPM | BODY MASS INDEX: 31.28 KG/M2 | TEMPERATURE: 98.6 F | SYSTOLIC BLOOD PRESSURE: 125 MMHG | OXYGEN SATURATION: 97 % | HEART RATE: 97 BPM

## 2017-10-03 DIAGNOSIS — R20.2 NUMBNESS AND TINGLING OF RIGHT FACE: ICD-10-CM

## 2017-10-03 DIAGNOSIS — R20.0 NUMBNESS AND TINGLING OF RIGHT FACE: ICD-10-CM

## 2017-10-03 LAB
ANION GAP SERPL CALCULATED.3IONS-SCNC: 8 MMOL/L (ref 3–14)
BASOPHILS # BLD AUTO: 0 10E9/L (ref 0–0.2)
BASOPHILS NFR BLD AUTO: 0.2 %
BUN SERPL-MCNC: 16 MG/DL (ref 7–30)
CALCIUM SERPL-MCNC: 8.7 MG/DL (ref 8.5–10.1)
CHLORIDE SERPL-SCNC: 103 MMOL/L (ref 94–109)
CO2 SERPL-SCNC: 26 MMOL/L (ref 20–32)
CREAT SERPL-MCNC: 0.85 MG/DL (ref 0.66–1.25)
DIFFERENTIAL METHOD BLD: ABNORMAL
EOSINOPHIL # BLD AUTO: 0.3 10E9/L (ref 0–0.7)
EOSINOPHIL NFR BLD AUTO: 2 %
ERYTHROCYTE [DISTWIDTH] IN BLOOD BY AUTOMATED COUNT: 12 % (ref 10–15)
GFR SERPL CREATININE-BSD FRML MDRD: >90 ML/MIN/1.7M2
GLUCOSE BLDC GLUCOMTR-MCNC: 214 MG/DL (ref 70–99)
GLUCOSE SERPL-MCNC: 235 MG/DL (ref 70–99)
HCT VFR BLD AUTO: 40.5 % (ref 40–53)
HGB BLD-MCNC: 13.9 G/DL (ref 13.3–17.7)
IMM GRANULOCYTES # BLD: 0.1 10E9/L (ref 0–0.4)
IMM GRANULOCYTES NFR BLD: 0.4 %
LYMPHOCYTES # BLD AUTO: 3.4 10E9/L (ref 0.8–5.3)
LYMPHOCYTES NFR BLD AUTO: 28.1 %
MCH RBC QN AUTO: 29.2 PG (ref 26.5–33)
MCHC RBC AUTO-ENTMCNC: 34.3 G/DL (ref 31.5–36.5)
MCV RBC AUTO: 85 FL (ref 78–100)
MONOCYTES # BLD AUTO: 0.8 10E9/L (ref 0–1.3)
MONOCYTES NFR BLD AUTO: 6.1 %
NEUTROPHILS # BLD AUTO: 7.7 10E9/L (ref 1.6–8.3)
NEUTROPHILS NFR BLD AUTO: 63.2 %
NRBC # BLD AUTO: 0 10*3/UL
NRBC BLD AUTO-RTO: 0 /100
PLATELET # BLD AUTO: 282 10E9/L (ref 150–450)
POTASSIUM SERPL-SCNC: 4 MMOL/L (ref 3.4–5.3)
RBC # BLD AUTO: 4.76 10E12/L (ref 4.4–5.9)
SODIUM SERPL-SCNC: 137 MMOL/L (ref 133–144)
WBC # BLD AUTO: 12.3 10E9/L (ref 4–11)

## 2017-10-03 PROCEDURE — A9585 GADOBUTROL INJECTION: HCPCS | Performed by: EMERGENCY MEDICINE

## 2017-10-03 PROCEDURE — 93005 ELECTROCARDIOGRAM TRACING: CPT

## 2017-10-03 PROCEDURE — 85025 COMPLETE CBC W/AUTO DIFF WBC: CPT | Performed by: EMERGENCY MEDICINE

## 2017-10-03 PROCEDURE — 70544 MR ANGIOGRAPHY HEAD W/O DYE: CPT | Mod: XS

## 2017-10-03 PROCEDURE — 70553 MRI BRAIN STEM W/O & W/DYE: CPT

## 2017-10-03 PROCEDURE — 00000146 ZZHCL STATISTIC GLUCOSE BY METER IP

## 2017-10-03 PROCEDURE — 25000128 H RX IP 250 OP 636: Performed by: EMERGENCY MEDICINE

## 2017-10-03 PROCEDURE — 70549 MR ANGIOGRAPH NECK W/O&W/DYE: CPT

## 2017-10-03 PROCEDURE — 96365 THER/PROPH/DIAG IV INF INIT: CPT

## 2017-10-03 PROCEDURE — 96361 HYDRATE IV INFUSION ADD-ON: CPT

## 2017-10-03 PROCEDURE — 25000132 ZZH RX MED GY IP 250 OP 250 PS 637: Performed by: EMERGENCY MEDICINE

## 2017-10-03 PROCEDURE — 80048 BASIC METABOLIC PNL TOTAL CA: CPT | Performed by: EMERGENCY MEDICINE

## 2017-10-03 PROCEDURE — 99285 EMERGENCY DEPT VISIT HI MDM: CPT | Mod: 25

## 2017-10-03 RX ORDER — ASPIRIN 325 MG
325 TABLET ORAL ONCE
Status: COMPLETED | OUTPATIENT
Start: 2017-10-03 | End: 2017-10-03

## 2017-10-03 RX ORDER — GADOBUTROL 604.72 MG/ML
10 INJECTION INTRAVENOUS ONCE
Status: COMPLETED | OUTPATIENT
Start: 2017-10-03 | End: 2017-10-03

## 2017-10-03 RX ADMIN — GADOBUTROL 10 ML: 604.72 INJECTION INTRAVENOUS at 20:46

## 2017-10-03 RX ADMIN — ASPIRIN 325 MG ORAL TABLET 325 MG: 325 PILL ORAL at 21:30

## 2017-10-03 RX ADMIN — SODIUM CHLORIDE 500 ML: 9 INJECTION, SOLUTION INTRAVENOUS at 19:14

## 2017-10-03 NOTE — ED AVS SNAPSHOT
[facility] Emergency Department    [address]    [facility] [address]    Phone:  [phone]    Fax:  [phone]                                       Kris Moeller Sr.   MRN: [id number]    Department:  [facility] Emergency Department   Date of Visit:  10/3/2017           After Visit Summary Signature Page     I have received my discharge instructions, and my questions have been answered. I have discussed any challenges I see with this plan with the nurse or doctor.    ..........................................................................................................................................  Patient/Patient Representative Signature      ..........................................................................................................................................  Patient Representative Print Name and Relationship to Patient    ..................................................               ................................................  Date                                            Time    ..........................................................................................................................................  Reviewed by Signature/Title    ...................................................              ..............................................  Date                                                            Time

## 2017-10-03 NOTE — ED NOTES
Pt arrives headache since 1100 this am, and tingling in right of face into ear, he reports he lost vision and saw colored spots in right eye apporx 1100, reports now vision in right eye has remained blurry. Pt a smoker stroke last January also diabetic. A/ox 3. No extremity weakness noted.

## 2017-10-03 NOTE — ED AVS SNAPSHOT
[facility] Emergency Department    [address] [place] [address]    Phone:  [phone]    Fax:  [phone]                                       Kris Moeller Sr.   MRN: [id number]    Department:  [facility] Emergency Department   Date of Visit:  10/3/2017           Patient Information     Date Of Birth          1975        Your diagnoses for this visit were:     Numbness and tingling of right face        You were seen by Deon Lee MD.      Follow-up Information     Follow up with [facility], [place].    Specialty:  Family Practice    Why:  for re-evaluation of your symptoms next available    Contact information:    [address]  [place] [address]  [phone]          Discharge Instructions         Parestesia [Paraesthesias]  La parestesia se refiere a leon sensación de ardor o picazón que a veces se siente en las aletha, los brazos, las piernas o los pies. También se puede presentar en otras partes del cuerpo. Puede sentirse hailey entumecimiento o cosquilleo, hormigueo o picazón en la piel. En general, la sensación es indolora.  La mayoría de las personas mcallister sentido alguna vez la sensación de hormigueo. Esta sensación se produce cuando se sabillon tenido las piernas cruzadas demasiado tiempo y se ejerce presión en el nervio. Es leon parestesia transitoria. Desaparece rápidamente leon vez que se shilo la presión.  Existen muchas posibles causas para la parestesis crónica. Entre ellas, trastornos hailey un ataque cerebral, un disco herniado (presionamiento de un nervio), un nervio atrapado en el hombro o la jane (hailey el síndrome de túnel carpiano), deficiencias de vitaminas e incluso ciertos medicamentos.  El tratamiento del trastorno depende de la causa. Es necesario realizar análisis de laboratorio para lograr un diagnóstico exacto. Estos exámenes pueden incluir análisis de miladys, radiografías, tomografías computarizadas o un examen muscular  (electromiografía). Dependiendo de la causa, el tratamiento puede incluir fisioterapia.  Cuidados En [place]:  1. No realice ningún cambio en sandy medicamentos sin consultar a hudson médico.  2. Si le sabillon recetado vitaminas, recuerde tomarlas todos los días en la dosis recomendada.  3. Debido a la disminución de la sensibilidad, leon mano o un pie entumecidos pueden ser más propensos a lesionarse. Tenga cuidado de proteger estas partes del cuerpo de cooper, golpes, contusiones, quemaduras u otras lesiones. Mantenga cortas sandy uñas y lávese las aletha y los pies con frecuencia. Use calzado que se adapte miguel a para evitar puntos de presión, ampollas y úlceras. Obsérvese cuidadosamente las aletha y los pies (incluida las plantas de los pies y el espacio entre los dedos de los pies) al menos leon vez a la semana y notifique a hudson médico si encontró leon herida abierta o signos de infección.  Seguimiento  con hudson médico o hailey lo indique nuestro personal. Es posible que necesite más exámenes para determinar la causa específica de hudson parestesia.  [NOTA: Si se realizaron análisis de miladys, tomografías computarizadas o electromiografías, los especialistas los revisarán. Se le notificará cualquier nuevo resultado que pueda afectar hudson atención.]  Busque Prontamente Atención Médica  si algo de lo siguiente ocurre:    Entumecimiento o debilidad de la tara, un brazo o leon pierna    Problemas de dicción, confusión, problemas para hablar, caminar o tank    Dolor de michael grave, episodios de desmayos, mareo o convulsiones    Dolor en el pecho, los brazos, el kerline o la parte superior de la espalda    Pérdida del control de la vejiga o del intestino    Herida abierta con enrojecimiento, hinchazón o salida de pus  Date Last Reviewed: 9/25/2015 2000-2017 [employer]. [address] [place] [address], [place] [address]. Todos los derechos reservados. Esta información no pretende sustituir la atención médica profesional. Sólo hudson médico  puede diagnosticar y tratar un problema de giovany.          24 Hour Appointment Hotline       To make an appointment at any [facility], call [phone] ([phone]). If you don't have a family doctor or clinic, we will help you find one. [place] clinics are conveniently located to serve the needs of you and your family.             Review of your medicines      Our records show that you are taking the medicines listed below. If these are incorrect, please call your family doctor or clinic.        Dose / Directions Last dose taken    aspirin 81 MG EC tablet   Dose:  81 mg   Quantity:  90 tablet        Take 1 tablet (81 mg) by mouth daily   Refills:  3        atorvastatin 20 MG tablet   Commonly known as:  LIPITOR   Dose:  20 mg   Quantity:  30 tablet        Take 1 tablet (20 mg) by mouth daily   Refills:  0        * insulin isophane & regular susp   Commonly known as:  HumuLIN MIX 70/30 PEN   Dose:  23 Units   Quantity:  15 mL        Inject 23 Units Subcutaneous every morning   Refills:  0        * insulin isophane & regular susp   Commonly known as:  HumuLIN MIX 70/30 PEN   Dose:  26 Units   Quantity:  15 mL        Inject 26 Units Subcutaneous every evening   Refills:  0        lisinopril 10 MG tablet   Commonly known as:  PRINIVIL/ZESTRIL   Dose:  10 mg   Quantity:  30 tablet        Take 1 tablet (10 mg) by mouth daily   Refills:  0        metFORMIN 500 MG tablet   Commonly known as:  GLUCOPHAGE   Dose:  1000 mg   Quantity:  120 tablet        Take 2 tablets (1,000 mg) by mouth 2 times daily (with meals)   Refills:  0        * Notice:  This list has 2 medication(s) that are the same as other medications prescribed for you. Read the directions carefully, and ask your doctor or other care provider to review them with you.            Procedures and tests performed during your visit     Basic metabolic panel    CBC with platelets differential    EKG 12-lead, tracing only    Glucose by meter    MR Brain w/o  & w Contrast    MR Head w/o Contrast Angiogram    MR Neck w/o & w Contrast Angiogram    Pulse oximetry nursing      Orders Needing Specimen Collection     None      Pending Results     Date and Time Order Name Status Description    10/3/2017 1907 MR Brain w/o & w Contrast Preliminary     10/3/2017 1907 MR Head w/o Contrast Angiogram Preliminary     10/3/2017 1907 MR Neck w/o & w Contrast Angiogram Preliminary             Pending Culture Results     No orders found from 10/1/2017 to 10/4/2017.            Pending Results Instructions     If you had any lab results that were not finalized at the time of your Discharge, you can call the ED Lab Result RN at [phone]. You will be contacted by this team for any positive Lab results or changes in treatment. The nurses are available 7 days a week from 10A to 6:30P.  You can leave a message 24 hours per day and they will return your call.        Test Results From Your Hospital Stay        10/3/2017  7:06 PM      Component Results     Component Value Ref Range & Units Status    Glucose 214 (H) 70 - 99 mg/dL Final    Dr/RN Notified         10/3/2017  7:32 PM      Component Results     Component Value Ref Range & Units Status    WBC 12.3 (H) 4.0 - 11.0 10e9/L Final    RBC Count 4.76 4.4 - 5.9 10e12/L Final    Hemoglobin 13.9 13.3 - 17.7 g/dL Final    Hematocrit 40.5 40.0 - 53.0 % Final    MCV 85 78 - 100 fl Final    MCH 29.2 26.5 - 33.0 pg Final    MCHC 34.3 31.5 - 36.5 g/dL Final    RDW 12.0 10.0 - 15.0 % Final    Platelet Count 282 150 - 450 10e9/L Final    Diff Method Automated Method  Final    % Neutrophils 63.2 % Final    % Lymphocytes 28.1 % Final    % Monocytes 6.1 % Final    % Eosinophils 2.0 % Final    % Basophils 0.2 % Final    % Immature Granulocytes 0.4 % Final    Nucleated RBCs 0 0 /100 Final    Absolute Neutrophil 7.7 1.6 - 8.3 10e9/L Final    Absolute Lymphocytes 3.4 0.8 - 5.3 10e9/L Final    Absolute Monocytes 0.8 0.0 - 1.3 10e9/L Final    Absolute  Eosinophils 0.3 0.0 - 0.7 10e9/L Final    Absolute Basophils 0.0 0.0 - 0.2 10e9/L Final    Abs Immature Granulocytes 0.1 0 - 0.4 10e9/L Final    Absolute Nucleated RBC 0.0  Final         10/3/2017  7:52 PM      Component Results     Component Value Ref Range & Units Status    Sodium 137 133 - 144 mmol/L Final    Potassium 4.0 3.4 - 5.3 mmol/L Final    Chloride 103 94 - 109 mmol/L Final    Carbon Dioxide 26 20 - 32 mmol/L Final    Anion Gap 8 3 - 14 mmol/L Final    Glucose 235 (H) 70 - 99 mg/dL Final    Urea Nitrogen 16 7 - 30 mg/dL Final    Creatinine 0.85 0.66 - 1.25 mg/dL Final    GFR Estimate >90 >60 mL/min/1.7m2 Final    Non  GFR Calc    GFR Estimate If Black >90 >60 mL/min/1.7m2 Final    African American GFR Calc    Calcium 8.7 8.5 - 10.1 mg/dL Final         10/3/2017  9:14 PM      Narrative     MRA ANGIOGRAM NECK WITHOUT AND WITH CONTRAST  10/3/2017  8:56 PM     HISTORY: Evaluate for dissection, vertebrobasilar flow, carotid  stenosis.    TECHNIQUE: 2D time-of-flight MR angiogram of the neck without contrast  and 3D MR angiogram of the neck with 10 mL gadolinium IV. Estimates of  carotid stenoses are made relative to the distal internal carotid  artery diameters except as noted.    COMPARISON: MR brain today. MR angiogram 1/12/2017.    FINDINGS:   Right carotid: Normal.    Left carotid: Normal.    Vertebral and basilar: Normal.    Aortic arch and intrathoracic arteries: Normal.        Impression     IMPRESSION: Normal MR angiogram of the neck. No interval change.         10/3/2017  9:15 PM      Narrative     MRA ANGIOGRAM HEAD WITHOUT CONTRAST  10/3/2017 8:56 PM    HISTORY: Headache, right facial tingling, vision changes.    COMPARISON: None.    TECHNIQUE: Routine [place] MRA.    FINDINGS: The [place] MRA is normal. Large-caliber vessels  are patent. No evidence for aneurysm. Both anterior cerebral arteries  are supplied from the left with a hypoplastic right A1 segment.  This  is a normal variant. No evidence for aneurysm.        Impression     IMPRESSION: Normal [place] MRA.         10/3/2017  9:13 PM      Narrative     MR BRAIN WITHOUT OR WITH CONTRAST  10/3/2017 8:56 PM    HISTORY: Headache, right facial tingling, vision changes.    COMPARISON: MR 1/12/2017.    TECHNIQUE: Sagittal T1, axial T2, axial FLAIR, axial GRE, axial  diffusion scan, coronal FLAIR, axial post Gd enhanced T1 weighted  images. 10 mL gadolinium.    FINDINGS: Diffusion scan shows no evidence for recent infarct. There  is a single tiny focus of hemosiderin deposition in the right  occipital lobe on series 5 image 12 due to an old microhemorrhage.  This can be seen in hypertension. No acute hemorrhage. There are 2 to  3 tiny foci of T2 hyperintensity in the white matter of each cerebral  hemisphere consistent with minimal small vessel ischemic disease,  sequelae of vascular headaches or immune reaction to prior viral  illness.    Sinuses are clear.        Impression     IMPRESSION:  1. Minimal chronic white matter disease.  2. Nothing acute.  3. Tiny old microhemorrhage in the right occipital lobe not evident on  the prior scan of 1/12/2017 but such a tiny focus could be missed or  volume averaged on the prior study.                  Clinical Quality Measure: Blood Pressure Screening     Your blood pressure was checked while you were in the emergency department today. The last reading we obtained was  BP: 125/75 . Please read the guidelines below about what these numbers mean and what you should do about them.  If your systolic blood pressure (the top number) is less than 120 and your diastolic blood pressure (the bottom number) is less than 80, then your blood pressure is normal. There is nothing more that you need to do about it.  If your systolic blood pressure (the top number) is 120-139 or your diastolic blood pressure (the bottom number) is 80-89, your blood pressure may be higher than it should  "be. You should have your blood pressure rechecked within a year by a primary care provider.  If your systolic blood pressure (the top number) is 140 or greater or your diastolic blood pressure (the bottom number) is 90 or greater, you may have high blood pressure. High blood pressure is treatable, but if left untreated over time it can put you at risk for heart attack, stroke, or kidney failure. You should have your blood pressure rechecked by a primary care provider within the next 4 weeks.  If your provider in the emergency department today gave you specific instructions to follow-up with your doctor or provider even sooner than that, you should follow that instruction and not wait for up to 4 weeks for your follow-up visit.        Thank you for choosing [facility]       Thank you for choosing [facility] for your care. Our goal is always to provide you with excellent care. Hearing back from our patients is one way we can continue to improve our services. Please take a few minutes to complete the written survey that you may receive in the mail after you visit with us. Thank you!        Mobile[employer] Information     [employer] lets you send messages to your doctor, view your test results, renew your prescriptions, schedule appointments and more. To sign up, go to [email or web address] . Click on \"Log in\" on the left side of the screen, which will take you to the Welcome page. Then click on \"Sign up Now\" on the right side of the page.     You will be asked to enter the access code listed below, as well as some personal information. Please follow the directions to create your username and password.     Your access code is: [email or web address]  Expires: 2018  9:31 PM     Your access code will  in 90 days. If you need help or a new code, please call your [facility] clinic or [phone].        Care EveryWhere ID     This is your Care EveryWhere ID. This could be used by other organizations to access your [facility] medical " records  [id number]        Equal Access to Services     NICANOR KHAN : Lon James, leigh khan, anjana saavedra. So [facility] [phone].    ATENCIÓN: Si habla español, tiene a hudson disposición servicios gratuitos de asistencia lingüística. Llame al [phone].    We comply with applicable federal civil rights laws and [place] laws. We do not discriminate on the basis of race, color, national origin, age, disability, sex, sexual orientation, or gender identity.            After Visit Summary       This is your record. Keep this with you and show to your community pharmacist(s) and doctor(s) at your next visit.

## 2017-10-04 LAB — INTERPRETATION ECG - MUSE: NORMAL

## 2017-10-04 NOTE — DISCHARGE INSTRUCTIONS
Parestesia [Paraesthesias]  La parestesia se refiere a leon sensación de ardor o picazón que a veces se siente en las aletha, los brazos, las piernas o los pies. También se puede presentar en otras partes del cuerpo. Puede sentirse hailey entumecimiento o cosquilleo, hormigueo o picazón en la piel. En general, la sensación es indolora.  La mayoría de las personas mcallister sentido alguna vez la sensación de hormigueo. Esta sensación se produce cuando se sabillon tenido las piernas cruzadas demasiado tiempo y se ejerce presión en el nervio. Es leon parestesia transitoria. Desaparece rápidamente leon vez que se shilo la presión.  Existen muchas posibles causas para la parestesis crónica. Entre ellas, trastornos hailey un ataque cerebral, un disco herniado (presionamiento de un nervio), un nervio atrapado en el hombro o la jane (hailey el síndrome de túnel carpiano), deficiencias de vitaminas e incluso ciertos medicamentos.  El tratamiento del trastorno depende de la causa. Es necesario realizar análisis de laboratorio para lograr un diagnóstico exacto. Estos exámenes pueden incluir análisis de miladys, radiografías, tomografías computarizadas o un examen muscular (electromiografía). Dependiendo de la causa, el tratamiento puede incluir fisioterapia.  Cuidados En [place]:  1. No realice ningún cambio en sandy medicamentos sin consultar a hudson médico.  2. Si le sabillon recetado vitaminas, recuerde tomarlas todos los días en la dosis recomendada.  3. Debido a la disminución de la sensibilidad, leon mano o un pie entumecidos pueden ser más propensos a lesionarse. Tenga cuidado de proteger estas partes del cuerpo de cooper, golpes, contusiones, quemaduras u otras lesiones. Mantenga cortas sandy uñas y lávese las aletha y los pies con frecuencia. Use calzado que se adapte miguel a para evitar puntos de presión, ampollas y úlceras. Obsérvese cuidadosamente las aletha y los pies (incluida las plantas de los pies y el espacio entre los dedos de los pies) al  menos leon vez a la semana y notifique a hudson médico si encontró leon herida abierta o signos de infección.  Seguimiento  con hudson médico o hailey lo indique nuestro personal. Es posible que necesite más exámenes para determinar la causa específica de hudson parestesia.  [NOTA: Si se realizaron análisis de miladys, tomografías computarizadas o electromiografías, los especialistas los revisarán. Se le notificará cualquier nuevo resultado que pueda afectar hudson atención.]  Busque Prontamente Atención Médica  si algo de lo siguiente ocurre:    Entumecimiento o debilidad de la tara, un brazo o leon pierna    Problemas de dicción, confusión, problemas para hablar, caminar o tank    Dolor de michael grave, episodios de desmayos, mareo o convulsiones    Dolor en el pecho, los brazos, el kerline o la parte superior de la espalda    Pérdida del control de la vejiga o del intestino    Herida abierta con enrojecimiento, hinchazón o salida de pus  Date Last Reviewed: 9/25/2015 2000-2017 [employer]. [address] [place] [id number]. Todos los derechos reservados. Esta información no pretende sustituir la atención médica profesional. Sólo hudson médico puede diagnosticar y tratar un problema de giovany.

## 2017-10-06 ASSESSMENT — ENCOUNTER SYMPTOMS
FEVER: 0
RESPIRATORY NEGATIVE: 1
SPEECH DIFFICULTY: 0
NUMBNESS: 1
WEAKNESS: 0
TROUBLE SWALLOWING: 0
CARDIOVASCULAR NEGATIVE: 1
FACIAL ASYMMETRY: 0
HEADACHES: 1

## 2017-10-06 NOTE — ED PROVIDER NOTES
History     Chief Complaint:  Headache and Tingling       HPI   Kris Moeller Sr. is a 42 year old male who presents with family ambulatory by car for evaluation of a headache and right sided facial tingling since mid morning.  Also experienced vague right sided vision changes.  PMHx notable for IDDM, HTN, hyperlipidemia and 1/17 admission to [facility] with syncopal event and right sided weakness.  Work-up that hospitalization included MRI/MRA brain, head, neck, demonstrating no acute stroke or explanation for symptoms, which resolved.  Echo did reveal PFO, ultimately he was dc'd on ASA 81 mg, with diagnosis of possible TIA.  No subsequent issues prior to today.  No associated chest pain, palpitations, or additional neurologic complaints.    Allergies:    Medications:      atorvastatin (LIPITOR) 20 MG tablet   lisinopril (PRINIVIL/ZESTRIL) 10 MG tablet   insulin isophane & regular (HUMULIN MIX 70/30 PEN) susp   insulin isophane & regular (HUMULIN MIX 70/30 PEN) susp   aspirin 81 MG EC tablet   metFORMIN (GLUCOPHAGE) 500 MG tablet     Past Medical History:    Past Medical History:   Diagnosis Date     Diabetes mellitus (H)      Hyperlipidemia      Hypertension      TIA (transient ischemic attack)        Patient Active Problem List    Diagnosis Date Noted     Confusion 01/12/2017     Priority: Medium      Past Surgical History:    History reviewed. No pertinent surgical history.   Family History:    family history is not on file.  Social History:   reports that he has been smoking.  He has been smoking about 1.00 pack per day. He does not have any smokeless tobacco history on file. He reports that he does not drink alcohol or use illicit drugs.    PCP: [facility], [place]     Review of Systems   Constitutional: Negative for fever.   HENT: Negative for drooling and trouble swallowing.    Eyes: Positive for visual disturbance.   Respiratory: Negative.    Cardiovascular: Negative.    Neurological: Positive for numbness and  headaches. Negative for facial asymmetry, speech difficulty and weakness.   All other systems reviewed and are negative.    Physical Exam   T: 98.6F, HR 97 RR 16 /85 mm Hg    Physical Exam  General: Patient is alert and cooperative.  HENT:  No facial weakness or asymmetry.  No facial swelling.  Normal ears, nose, oropharynx.  Eyes: EOMI, PERRLA.  Normal conjunctiva. No nystagmus.  No visual field cut.  Neck: Normal range of motion. Neck supple.  Cardiovascular: Normal rate, regular rhythm and normal heart sounds.   Pulmonary/Chest: Effort normal. Clear.   Abdominal: Soft. Patient exhibits no distension and no mass. There is no tenderness.       Musculoskeletal: Normal range of motion. Patient exhibits no edema and no tenderness.   Neurological: Patient  is alert and oriented. Normal CN, normal strenghth, sensation x 4.  Normal cerebellar function.  Skin: Skin is warm and dry. No rash noted.   Psychiatric: Patient has a normal mood and affect. Normal behavior and judgement.    Emergency Department Course     ECG   NSR, no ectopy or ischemic changes.  Interpreted by Jesus.     Imaging:  MR Neck w/o & w Contrast Angiogram   Final Result   IMPRESSION: Normal MR angiogram of the neck. No interval change.      SRI ROSA MD      MR Head w/o Contrast Angiogram   Final Result   IMPRESSION: Normal [place] MRA.      SRI ROSA MD      MR Brain w/o & w Contrast   Final Result   IMPRESSION:   1. Minimal chronic white matter disease.   2. Nothing acute.   3. Tiny old microhemorrhage in the right occipital lobe not evident on   the prior scan of 1/12/2017 but such a tiny focus could be missed or   volume averaged on the prior study.      SRI ROSA MD           Laboratory:  Labs Ordered and Resulted from Time of ED Arrival Up to the Time of Departure from the ED   GLUCOSE BY METER - Abnormal; Notable for the following:        Result Value    Glucose 214 (*)     All other components within  normal limits   CBC WITH PLATELETS DIFFERENTIAL - Abnormal; Notable for the following:     WBC 12.3 (*)     All other components within normal limits   BASIC METABOLIC PANEL - Abnormal; Notable for the following:     Glucose 235 (*)     All other components within normal limits   PULSE OXIMETRY NURSING      Interventions:  Medications   0.9% sodium chloride BOLUS (0 mLs Intravenous Stopped 10/3/17 2110)   gadobutrol (GADAVIST) injection 10 mL (10 mLs Intravenous Given 10/3/17 2046)   sodium chloride (PF) 0.9% PF flush 60 mL (60 mLs Intravenous Given 10/3/17 2046)   aspirin tablet 325 mg (325 mg Oral Given 10/3/17 2130)        Emergency Department Course:  Past medical records, nursing notes, and vitals reviewed.  I performed an exam of the patient and obtained history, as documented above.    I rechecked the patient. Findings and plan explained to the Patient and family.    Impression & Plan    Medical Decision Makin year old non English speaking  male with right sided facial numbness and transient vision change.  Physical examination is normal.  Given symptoms, co morbidities, and prior history of possible TIA, w/u included MRI brain and MRI/MRA head/neck, which demonstrate no acute stroke or other abnormality to explain symptoms.  He does have a known PFO and is on low dose ASA.  Give negative w/u and normal examination, admission is not required.  I've recommended increasing ASA to 324 mg daily and f/u  next available for re-check and further management.  Given recurrent symptoms, question of whether PFO closure is indicated should be addressed in the outpatient setting.  Other possibilities, given HA, include complex migraine or somatoform d/o.        Diagnosis:    ICD-10-CM    1. Numbness and tingling of right face R20.0     R20.2         Discharge Medications:  Discharge Medication List as of 10/3/2017  9:34 PM           10/6/2017   No att. providers found        Deon Lee  MD  10/06/17 105

## 2020-04-07 ENCOUNTER — HOSPITAL ENCOUNTER (INPATIENT)
Facility: CLINIC | Age: 45
LOS: 5 days | Discharge: HOME | End: 2020-04-12
Attending: PHYSICIAN ASSISTANT | Admitting: STUDENT IN AN ORGANIZED HEALTH CARE EDUCATION/TRAINING PROGRAM
Payer: MEDICAID

## 2020-04-07 ENCOUNTER — APPOINTMENT (OUTPATIENT)
Dept: GENERAL RADIOLOGY | Facility: CLINIC | Age: 45
End: 2020-04-07
Attending: EMERGENCY MEDICINE
Payer: MEDICAID

## 2020-04-07 DIAGNOSIS — Z79.4 CONTROLLED TYPE 2 DIABETES MELLITUS WITH COMPLICATION, WITH LONG-TERM CURRENT USE OF INSULIN (H): ICD-10-CM

## 2020-04-07 DIAGNOSIS — L02.416 ABSCESS OF LEFT THIGH: ICD-10-CM

## 2020-04-07 DIAGNOSIS — L03.116 CELLULITIS OF LEFT THIGH: ICD-10-CM

## 2020-04-07 DIAGNOSIS — E11.8 CONTROLLED TYPE 2 DIABETES MELLITUS WITH COMPLICATION, WITH LONG-TERM CURRENT USE OF INSULIN (H): ICD-10-CM

## 2020-04-07 DIAGNOSIS — L03.116 CELLULITIS OF LEFT LOWER EXTREMITY: Primary | ICD-10-CM

## 2020-04-07 DIAGNOSIS — R50.9 FEVER IN ADULT: ICD-10-CM

## 2020-04-07 PROBLEM — L03.90 CELLULITIS: Status: ACTIVE | Noted: 2020-04-07

## 2020-04-07 LAB
ANION GAP SERPL CALCULATED.3IONS-SCNC: 3 MMOL/L (ref 3–14)
BASOPHILS # BLD AUTO: 0 10E9/L (ref 0–0.2)
BASOPHILS NFR BLD AUTO: 0.2 %
BUN SERPL-MCNC: 26 MG/DL (ref 7–30)
CALCIUM SERPL-MCNC: 8.5 MG/DL (ref 8.5–10.1)
CHLORIDE SERPL-SCNC: 103 MMOL/L (ref 94–109)
CO2 SERPL-SCNC: 27 MMOL/L (ref 20–32)
CREAT SERPL-MCNC: 1.22 MG/DL (ref 0.66–1.25)
CRP SERPL-MCNC: 110 MG/L (ref 0–8)
DIFFERENTIAL METHOD BLD: ABNORMAL
EOSINOPHIL # BLD AUTO: 0.1 10E9/L (ref 0–0.7)
EOSINOPHIL NFR BLD AUTO: 0.7 %
ERYTHROCYTE [DISTWIDTH] IN BLOOD BY AUTOMATED COUNT: 11.7 % (ref 10–15)
ERYTHROCYTE [SEDIMENTATION RATE] IN BLOOD BY WESTERGREN METHOD: 40 MM/H (ref 0–15)
GFR SERPL CREATININE-BSD FRML MDRD: 71 ML/MIN/{1.73_M2}
GLUCOSE BLDC GLUCOMTR-MCNC: 244 MG/DL (ref 70–99)
GLUCOSE BLDC GLUCOMTR-MCNC: 316 MG/DL (ref 70–99)
GLUCOSE SERPL-MCNC: 295 MG/DL (ref 70–99)
HCT VFR BLD AUTO: 40.8 % (ref 40–53)
HGB BLD-MCNC: 13.7 G/DL (ref 13.3–17.7)
IMM GRANULOCYTES # BLD: 0.1 10E9/L (ref 0–0.4)
IMM GRANULOCYTES NFR BLD: 0.6 %
LACTATE BLD-SCNC: 1.1 MMOL/L (ref 0.7–2)
LYMPHOCYTES # BLD AUTO: 2.1 10E9/L (ref 0.8–5.3)
LYMPHOCYTES NFR BLD AUTO: 12.4 %
MCH RBC QN AUTO: 29.1 PG (ref 26.5–33)
MCHC RBC AUTO-ENTMCNC: 33.6 G/DL (ref 31.5–36.5)
MCV RBC AUTO: 87 FL (ref 78–100)
MONOCYTES # BLD AUTO: 1.5 10E9/L (ref 0–1.3)
MONOCYTES NFR BLD AUTO: 8.6 %
NEUTROPHILS # BLD AUTO: 13.1 10E9/L (ref 1.6–8.3)
NEUTROPHILS NFR BLD AUTO: 77.5 %
NRBC # BLD AUTO: 0 10*3/UL
NRBC BLD AUTO-RTO: 0 /100
PLATELET # BLD AUTO: 323 10E9/L (ref 150–450)
POTASSIUM SERPL-SCNC: 4.3 MMOL/L (ref 3.4–5.3)
RBC # BLD AUTO: 4.71 10E12/L (ref 4.4–5.9)
SODIUM SERPL-SCNC: 133 MMOL/L (ref 133–144)
WBC # BLD AUTO: 16.9 10E9/L (ref 4–11)

## 2020-04-07 PROCEDURE — 80048 BASIC METABOLIC PNL TOTAL CA: CPT | Performed by: PHYSICIAN ASSISTANT

## 2020-04-07 PROCEDURE — 25800030 ZZH RX IP 258 OP 636: Performed by: STUDENT IN AN ORGANIZED HEALTH CARE EDUCATION/TRAINING PROGRAM

## 2020-04-07 PROCEDURE — 83605 ASSAY OF LACTIC ACID: CPT | Performed by: PHYSICIAN ASSISTANT

## 2020-04-07 PROCEDURE — 73562 X-RAY EXAM OF KNEE 3: CPT | Mod: LT

## 2020-04-07 PROCEDURE — 99223 1ST HOSP IP/OBS HIGH 75: CPT | Mod: AI | Performed by: STUDENT IN AN ORGANIZED HEALTH CARE EDUCATION/TRAINING PROGRAM

## 2020-04-07 PROCEDURE — 85652 RBC SED RATE AUTOMATED: CPT | Performed by: EMERGENCY MEDICINE

## 2020-04-07 PROCEDURE — 00000146 ZZHCL STATISTIC GLUCOSE BY METER IP

## 2020-04-07 PROCEDURE — 25000128 H RX IP 250 OP 636: Performed by: PHYSICIAN ASSISTANT

## 2020-04-07 PROCEDURE — 36415 COLL VENOUS BLD VENIPUNCTURE: CPT | Performed by: PHYSICIAN ASSISTANT

## 2020-04-07 PROCEDURE — 12000000 ZZH R&B MED SURG/OB

## 2020-04-07 PROCEDURE — 25000131 ZZH RX MED GY IP 250 OP 636 PS 637: Performed by: STUDENT IN AN ORGANIZED HEALTH CARE EDUCATION/TRAINING PROGRAM

## 2020-04-07 PROCEDURE — 36415 COLL VENOUS BLD VENIPUNCTURE: CPT | Performed by: EMERGENCY MEDICINE

## 2020-04-07 PROCEDURE — 99285 EMERGENCY DEPT VISIT HI MDM: CPT | Mod: 25

## 2020-04-07 PROCEDURE — 85025 COMPLETE CBC W/AUTO DIFF WBC: CPT | Performed by: PHYSICIAN ASSISTANT

## 2020-04-07 PROCEDURE — 25000132 ZZH RX MED GY IP 250 OP 250 PS 637: Performed by: STUDENT IN AN ORGANIZED HEALTH CARE EDUCATION/TRAINING PROGRAM

## 2020-04-07 PROCEDURE — 96365 THER/PROPH/DIAG IV INF INIT: CPT

## 2020-04-07 PROCEDURE — 86140 C-REACTIVE PROTEIN: CPT | Performed by: EMERGENCY MEDICINE

## 2020-04-07 PROCEDURE — 87040 BLOOD CULTURE FOR BACTERIA: CPT | Performed by: PHYSICIAN ASSISTANT

## 2020-04-07 RX ORDER — PROCHLORPERAZINE 25 MG
25 SUPPOSITORY, RECTAL RECTAL EVERY 12 HOURS PRN
Status: DISCONTINUED | OUTPATIENT
Start: 2020-04-07 | End: 2020-04-12 | Stop reason: HOSPADM

## 2020-04-07 RX ORDER — ONDANSETRON 2 MG/ML
4 INJECTION INTRAMUSCULAR; INTRAVENOUS EVERY 6 HOURS PRN
Status: DISCONTINUED | OUTPATIENT
Start: 2020-04-07 | End: 2020-04-09

## 2020-04-07 RX ORDER — LIDOCAINE 40 MG/G
CREAM TOPICAL
Status: DISCONTINUED | OUTPATIENT
Start: 2020-04-07 | End: 2020-04-11

## 2020-04-07 RX ORDER — DEXTROSE MONOHYDRATE 25 G/50ML
25-50 INJECTION, SOLUTION INTRAVENOUS
Status: DISCONTINUED | OUTPATIENT
Start: 2020-04-07 | End: 2020-04-12 | Stop reason: HOSPADM

## 2020-04-07 RX ORDER — NALOXONE HYDROCHLORIDE 0.4 MG/ML
.1-.4 INJECTION, SOLUTION INTRAMUSCULAR; INTRAVENOUS; SUBCUTANEOUS
Status: DISCONTINUED | OUTPATIENT
Start: 2020-04-07 | End: 2020-04-09

## 2020-04-07 RX ORDER — ACETAMINOPHEN 325 MG/1
650 TABLET ORAL EVERY 4 HOURS PRN
Status: DISCONTINUED | OUTPATIENT
Start: 2020-04-07 | End: 2020-04-09

## 2020-04-07 RX ORDER — INSULIN ASPART 100 [IU]/ML
15 INJECTION, SOLUTION INTRAVENOUS; SUBCUTANEOUS 2 TIMES DAILY WITH MEALS
Status: ON HOLD | COMMUNITY
End: 2020-04-12

## 2020-04-07 RX ORDER — CEFTRIAXONE 1 G/1
1 INJECTION, POWDER, FOR SOLUTION INTRAMUSCULAR; INTRAVENOUS EVERY 24 HOURS
Status: DISCONTINUED | OUTPATIENT
Start: 2020-04-08 | End: 2020-04-11

## 2020-04-07 RX ORDER — PROCHLORPERAZINE MALEATE 5 MG
10 TABLET ORAL EVERY 6 HOURS PRN
Status: DISCONTINUED | OUTPATIENT
Start: 2020-04-07 | End: 2020-04-12 | Stop reason: HOSPADM

## 2020-04-07 RX ORDER — ASPIRIN 81 MG/1
81 TABLET ORAL DAILY
Status: DISCONTINUED | OUTPATIENT
Start: 2020-04-08 | End: 2020-04-09 | Stop reason: DRUGHIGH

## 2020-04-07 RX ORDER — SODIUM CHLORIDE, SODIUM LACTATE, POTASSIUM CHLORIDE, CALCIUM CHLORIDE 600; 310; 30; 20 MG/100ML; MG/100ML; MG/100ML; MG/100ML
INJECTION, SOLUTION INTRAVENOUS CONTINUOUS
Status: DISCONTINUED | OUTPATIENT
Start: 2020-04-07 | End: 2020-04-08

## 2020-04-07 RX ORDER — LISINOPRIL 10 MG/1
10 TABLET ORAL DAILY
Status: DISCONTINUED | OUTPATIENT
Start: 2020-04-08 | End: 2020-04-07

## 2020-04-07 RX ORDER — NICOTINE POLACRILEX 4 MG
15-30 LOZENGE BUCCAL
Status: DISCONTINUED | OUTPATIENT
Start: 2020-04-07 | End: 2020-04-12 | Stop reason: HOSPADM

## 2020-04-07 RX ORDER — OXYCODONE HYDROCHLORIDE 5 MG/1
5 TABLET ORAL EVERY 6 HOURS PRN
Status: DISCONTINUED | OUTPATIENT
Start: 2020-04-07 | End: 2020-04-08

## 2020-04-07 RX ORDER — CEFTRIAXONE 2 G/1
2 INJECTION, POWDER, FOR SOLUTION INTRAMUSCULAR; INTRAVENOUS ONCE
Status: COMPLETED | OUTPATIENT
Start: 2020-04-07 | End: 2020-04-07

## 2020-04-07 RX ORDER — ONDANSETRON 4 MG/1
4 TABLET, ORALLY DISINTEGRATING ORAL EVERY 6 HOURS PRN
Status: DISCONTINUED | OUTPATIENT
Start: 2020-04-07 | End: 2020-04-09

## 2020-04-07 RX ORDER — ATORVASTATIN CALCIUM 20 MG/1
20 TABLET, FILM COATED ORAL DAILY
Status: DISCONTINUED | OUTPATIENT
Start: 2020-04-08 | End: 2020-04-07

## 2020-04-07 RX ADMIN — SODIUM CHLORIDE, POTASSIUM CHLORIDE, SODIUM LACTATE AND CALCIUM CHLORIDE: 600; 310; 30; 20 INJECTION, SOLUTION INTRAVENOUS at 18:21

## 2020-04-07 RX ADMIN — INSULIN ASPART 3 UNITS: 100 INJECTION, SOLUTION INTRAVENOUS; SUBCUTANEOUS at 18:48

## 2020-04-07 RX ADMIN — INSULIN DETEMIR 30 UNITS: 100 INJECTION, SOLUTION SUBCUTANEOUS at 22:42

## 2020-04-07 RX ADMIN — CEFTRIAXONE 2 G: 2 INJECTION, POWDER, FOR SOLUTION INTRAMUSCULAR; INTRAVENOUS at 15:42

## 2020-04-07 RX ADMIN — OXYCODONE HYDROCHLORIDE 5 MG: 5 TABLET ORAL at 19:47

## 2020-04-07 ASSESSMENT — ENCOUNTER SYMPTOMS
WOUND: 1
FEVER: 1

## 2020-04-07 ASSESSMENT — MIFFLIN-ST. JEOR: SCORE: 1646.03

## 2020-04-07 ASSESSMENT — ACTIVITIES OF DAILY LIVING (ADL): ADLS_ACUITY_SCORE: 11

## 2020-04-07 NOTE — ED NOTES
"[facility]  ED Nurse Handoff Report    Kris Moeller Sr. is a 45 year old male   ED Chief complaint: Wound Infection  . ED Diagnosis:   Final diagnoses:   Cellulitis of left thigh   Abscess of left thigh   Fever in adult     Allergies: No Known Allergies    Code Status: Full Code  Activity level - Baseline/Home:  Independent. Activity Level - Current:   Assist X 1. Lift room needed: No. Bariatric: No   Needed: Yes, Serbian   Isolation: No. Infection: Not Applicable.     Vital Signs:   Vitals:    04/07/20 1535 04/07/20 1600 04/07/20 1630 04/07/20 1638   BP:  137/83 130/89    Pulse:  78 75    Resp:       Temp:       TempSrc:       SpO2:    100%   Weight: 84.4 kg (186 lb)          Cardiac Rhythm:  ,      Pain level:    Patient confused: No. Patient Falls Risk: Yes.   Elimination Status: Has voided   Patient Report - Initial Complaint: Wound Infection. Focused Assessment: HPI per provider note: \"Kris Moeller Sr. is a 45 year old male with history of diabetes, tobacco abuse, hypertension, hyperlipidemia, among others who presents for evaluation of a left leg wound.  Patient reports noticing a papule to the lateral aspect of his left knee approximately 2 weeks ago.  He states that this has increased in size, become more painful, and also developed purulent drainage, with purulence worsening over the last week.  He states that he is now having difficulty walking secondary to his pain.  He does note that he had a similar bump in the past and was prescribed Bactrim with good resolution.  He did have 1 dose of this left and took this dose this a.m.  Of note, the patient states that last night he measured his temperature to be 100.2F.\"   Tests Performed: labs, xray. Abnormal Results: .  Labs Ordered and Resulted from Time of ED Arrival Up to the Time of Departure from the ED   CBC WITH PLATELETS DIFFERENTIAL - Abnormal; Notable for the following components:       Result Value    WBC 16.9 (*)     Absolute " "Neutrophil 13.1 (*)     Absolute Monocytes 1.5 (*)     All other components within normal limits   BASIC METABOLIC PANEL - Abnormal; Notable for the following components:    Glucose 295 (*)     All other components within normal limits   CRP INFLAMMATION - Abnormal; Notable for the following components:    CRP Inflammation 110.0 (*)     All other components within normal limits   ERYTHROCYTE SEDIMENTATION RATE AUTO - Abnormal; Notable for the following components:    Sed Rate 40 (*)     All other components within normal limits   LACTIC ACID WHOLE BLOOD   NURSING DRAW AND HOLD   BLOOD CULTURE   BLOOD CULTURE   .  XR Knee Left 3 Views   Final Result   IMPRESSION: Unremarkable examination.       NATACHA MCLEAN MD      .   Treatments provided: IV antibiotics   Family Comments: pt alone in ER  OBS brochure/video discussed/provided to patient:  N/A  ED Medications:   Medications   cefTRIAXone (ROCEPHIN) 2 g vial to attach to  ml bag for ADULTS or NS 50 ml bag for PEDS (0 g Intravenous Stopped 4/7/20 7490)     Drips infusing:  No  For the majority of the shift, the patient's behavior Green. Interventions performed were n/a.    Sepsis treatment initiated: No       ED Nurse Name/Phone Number: Kristen \"G\" CORDELL Van,   4:39 PM    RECEIVING UNIT ED HANDOFF REVIEW    Above ED Nurse Handoff Report was reviewed: Yes  Reviewed by: Luly Liu RN on April 7, 2020 at 5:30 PM     "

## 2020-04-07 NOTE — LETTER
Transition Communication Hand-off for Care Transitions to Next Level of Care Provider    Name: Kris Moeller SrKiana  : 1975  MRN #: [id number]  Primary Care Provider: [facility]  Primary Care MD Name: Sheng Lynne  Primary Clinic: [address] [place] [address]  [facility] Name: [facility]   Reason for Hospitalization:  Cellulitis of left thigh [L03.116]  Fever in adult [R50.9]  Abscess of left thigh [L02.416]  Admit Date/Time: 2020  1:08 PM  Discharge Date: 20  Payor Source: No coverage found.      Readmission Assessment Measure (KORTNEY) Risk Score/category:          Reason for Communication Hand-off Referral: Other uncontrolled DM    Discharge Plan:       Concern for non-adherence with plan of care:   Yes  Discharge Needs Assessment:  Needs      Most Recent Value   Equipment Currently Used at Home  none   # of Referrals Placed by CTS  Scheduled Follow-up appointments          Already enrolled in Tele-monitoring program and name of program:  NA  Follow-up specialty is recommended: No    Follow-up plan:  No future appointments.    Any outstanding tests or procedures:            Supplies     Future Labs/Procedures    Walker     Process Instructions:    By signing this order, the Authorizing Provider is attesting that they have completed a face-to-face evaluation on the patient to determine their need for this equipment in the last 60 days. A new face-to-face evaluation is required each time  A new prescription for on eo f the specified items is ordered.   If an additional provider completed the evaluation, please indicate their name below.     **As of 2018, an order requisition and face sheet will print for all DME orders. Please give printed order and face sheet to patient if not obtaining product from [facility] DME closet.     Comments:    DME Documentation:   Describe the reason for need to support medical necessity: decreased tolerance to wt bearing.     I,  the undersigned, certify that the above prescribed supplies are medically necessary for this patient and is both reasonable and necessary in reference to accepted standards of medical and necessary in reference to accepted standards of medical practice in the treatment of this patient's condition and is not prescribed as a convenience.          Key Recommendations: pt admitted with left leg abcess and A1C of 12.3. Pt states he checks his blood sugars 2 times a day but follows a regular diet. He has not been to the doctor and a couple years. Admits to needing diabetic diet recommendations and agreeable to follow up with his PCP. Pt states he has testing supplies and insulin at home. CM attached diabetic diet resources to AVS in [place] per pts request and consulted Nutrition for diabetic meal management.     Pt need close follow up with diabetic diet compliance and importance to follow up with PCP.     Triny Yeung RN/ sent by LYLE Harrison RN, CM     AVS/Discharge Summary is the source of truth; this is a helpful guide for improved communication of patient story

## 2020-04-07 NOTE — LETTER
Key Recommendations: pt admitted with   Left leg abcess and A1C of 12.3. Pt states he checks his blood sugars 2 times a day but follows a regular diet. He has not been to the doctor and a couple years. Admits to needing diabetic diet recommendations and agreeable to follow up with his PCP. Pt states he has testing supplies and insulin at home. CM attached diabetic diet resources to AVS in Amharic per pts request and consulted Nutrition for diabetic meal management.     Pt need close follow up with diabetic diet compliance and importance to follow up with PCP.     Triny Yeung RN    AVS/Discharge Summary is the source of truth; this is a helpful guide for improved communication of patient story

## 2020-04-07 NOTE — ED TRIAGE NOTES
"\" I feel like I have infection on my foot and I have diabetes and I can't really walk\". Patients states it has been there one week. States he has had fevers at night as high as 100.2.   "

## 2020-04-07 NOTE — H&P
[facility]    History and Physical - Hospitalist Service       Date of Admission:  4/7/2020    Assessment & Plan   Kris Moeller Sr. is a 45 year old male admitted on 4/7/2020. He presents with left leg pain.       Cellulitis    Leukocytosis    Assessment: Presents with left lateral knee wound with surrounding erythema, tenderness, evidence of previous purulence.  Admission labs pertinent for a WBC of 16.9, CRP of 110.0.  Was started on IV ceftriaxone in the emergency department.  Knee x-ray showed no acute complications of osteomyelitis.    Plan:   -Admit inpatient  -Continue IV ceftriaxone  -Infectious disease consult  -Consider surgery eval in AM pending re-evaluation  -Follow vitals/temp  -NPO @ Midnight    Hypertension  Assessment/plan: Likely pain related, prior to admission not on antihypertensives.  Would monitor for now.      Hyperlipidemia    Assessment/Plan: Not currently on statin, can follow-up as outpatient      Diabetes mellitus type II, uncontrolled (H)    Assessment: Prior to mission, patient is on Levemir 45 units at bedtime    Plan:   -Reduce Levemir to 30 units at bedtime  -Medium sliding scale insulin as needed  -Hypoglycemia protocol  -Hold prior to mission metformin       Diet: Moderate Consistent CHO Diet    DVT Prophylaxis: Low Risk/Ambulatory with no VTE prophylaxis indicated  Razo Catheter: not present  Code Status: Full Code      Disposition Plan   Expected discharge: 2 - 3 days, recommended to prior living arrangement once antibiotic plan established.  Entered: Mandeep Oconnor MD 04/07/2020, 6:49 PM     The patient's care was discussed with the Bedside Nurse, Patient and ED Provider.    Mandeep Oconnor MD  [facility]    ______________________________________________________________________    Chief Complaint     Left Leg/knee pain    History is obtained from the patient    History of Present Illness      Kris Moeller Sr. is a 45 year old male with past medical history of  type 2 diabetes mellitus, tobacco abuse, hypertension, hyperlipidemia who presents for evaluation of left knee pain/swelling.    Patient reports that for the last 2 weeks, he is noticed what was a small papule on the lateral aspect of his left knee expand to become significantly more painful and over the past few days having increasing purulent drainage.  He also reports of a fever of 100.6  F.  He does endorse some chills.  He denies any recent trauma.  He reports that the pain has made it difficult for him to ambulate over the past week.  He reports that he has had an infection in that spot in the past before, resolved with Bactrim.  He did take a dose of Bactrim this a.m. from a prior prescription.  He took ibuprofen with minimal pain relief.  He otherwise denies any calf pain or leg swelling.  He denies any nausea/vomiting abdominal pain.  He denies any PND orthopnea.  He denies any chest pain or shortness of breath.  He denies no cough.  He denies any recent travel.  He otherwise has no other complaints this time.  He denies any puncture wounds in the past 2 weeks.    Review of Systems      The 10 point Review of Systems is negative other than noted in the HPI or here.     Past Medical History    I have reviewed this patient's medical history and updated it with pertinent information if needed.   Past Medical History:   Diagnosis Date     Diabetes mellitus (H)      Hyperlipidemia      Hypertension      TIA (transient ischemic attack)        Past Surgical History   I have reviewed this patient's surgical history and updated it with pertinent information if needed.  History reviewed. No pertinent surgical history.    Social History   I have reviewed this patient's social history and updated it with pertinent information if needed.  Social History     Tobacco Use     Smoking status: Current Every Day Smoker     Packs/day: 0.50     Smokeless tobacco: Never Used   Substance Use Topics     Alcohol use: No     Drug  use: No       Family History   I have reviewed this patient's family history and updated it with pertinent information if needed.   History reviewed. No pertinent family history.    Prior to Admission Medications   Prior to Admission Medications   Prescriptions Last Dose Informant Patient Reported? Taking?   aspirin 81 MG EC tablet 4/7/2020 at Unknown time  No Yes   Sig: Take 1 tablet (81 mg) by mouth daily   insulin aspart (NOVOLOG FLEXPEN) 100 UNIT/ML pen   Yes Yes   Sig: Inject 15 Units Subcutaneous 2 times daily (with meals)   insulin detemir (LEVEMIR PEN) 100 UNIT/ML pen 4/6/2020 at Unknown time  Yes Yes   Sig: Inject 45 Units Subcutaneous At Bedtime   metFORMIN (GLUCOPHAGE) 1000 MG tablet 4/7/2020 at am  Yes Yes   Sig: Take 1,000 mg by mouth 2 times daily (with meals)      Facility-Administered Medications: None     Allergies   No Known Allergies    Physical Exam   Vital Signs: Temp: 97.7  F (36.5  C) Temp src: Temporal BP: (!) 157/83 Pulse: 84 Heart Rate: 96 Resp: 16 SpO2: 99 % O2 Device: None (Room air)    Weight: 185 lbs 0 oz    Constitutional: awake, alert, cooperative, no apparent distress.   Eyes: Lids and lashes normal, pupils equal, round and reactive to light   ENT: Normocephalic, without obvious abnormality, atraumatic, sinuses nontender on palpation   Hematologic / Lymphatic: no cervical lymphadenopathy   Respiratory: CTABL   Cardiovascular: RRR with no m/r/g   GI: Normal bowel sounds, soft, non-distended, non-tender. Skin: normal skin color, texture, turgor   Musculoskeletal: there is a wound to lateral aspect of left lower extremity with tender erythema surrounding large area of hardened purulence. See picture documented by ED Provider.    Neurologic: Awake, alert, oriented to name, place and time. Cranial nerves II-XII are grossly intact. Motor is 5 out of 5 bilaterally. Sensory is intact.   Neuropsychiatric: normal mood and affect      Data   Data reviewed today: I reviewed all medications, new  labs and imaging results over the last 24 hours. I personally reviewed the left knee XR image(s) showing see below.    XR Knee Left 3 Views  IMPRESSION: Unremarkable examination.  Reading per radiology.      Recent Labs   Lab 04/07/20  1323   WBC 16.9*   HGB 13.7   MCV 87         POTASSIUM 4.3   CHLORIDE 103   CO2 27   BUN 26   CR 1.22   ANIONGAP 3   KHALIDA 8.5   *     Recent Results (from the past 24 hour(s))   XR Knee Left 3 Views    Narrative    LEFT KNEE THREE VIEWS 4/7/2020 3:29 PM    HISTORY: Cellulitis and abscess to left lateral knee. No knee effusion    COMPARISON: None.    FINDINGS: No fracture or osseous lesion is seen. The joint spaces are  well preserved. No soft tissue pathology is seen.       Impression    IMPRESSION: Unremarkable examination.     NATACHA MCLEAN MD

## 2020-04-07 NOTE — PHARMACY-ADMISSION MEDICATION HISTORY
Admission medication history interview status for this patient is complete. See [facility] admission navigator for allergy information, prior to admission medications and immunization status.     Medication history interview source(s):Patient via call to the room.  Family - wife via phone: looked at Rxs or rx bottles,  Medication history resources (including written lists, pill bottles, clinic record):pt, family    Changes made to PTA medication list:  Added: Novolog, Levemir, Metformin  Deleted: Lipitor, Lisinopril, Humulin mix 70/30  Changed: as above    Medication reconciliation/reorder completed by provider prior to medication history?  Y    For patients on insulin therapy: Y  (Y/N)  Novolog 15 units bid meals,  Levemir 45 units qhs      Prior to Admission medications    Medication Sig Last Dose Taking? Auth Provider   aspirin 81 MG EC tablet Take 1 tablet (81 mg) by mouth daily 4/7/2020 at Unknown time Yes Savanna Mcclure PA-C   insulin aspart (NOVOLOG FLEXPEN) 100 UNIT/ML pen Inject 15 Units Subcutaneous 2 times daily (with meals)  Yes Unknown, Entered By History   insulin detemir (LEVEMIR PEN) 100 UNIT/ML pen Inject 45 Units Subcutaneous At Bedtime 4/6/2020 at Unknown time Yes Unknown, Entered By History   metFORMIN (GLUCOPHAGE) 1000 MG tablet Take 1,000 mg by mouth 2 times daily (with meals) 4/7/2020 at am Yes Unknown, Entered By History

## 2020-04-07 NOTE — ED PROVIDER NOTES
History   Chief Complaint:  Wound Infection     HPI   Kris Moeller Sr. is a 45 year old male with history of diabetes, tobacco abuse, hypertension, hyperlipidemia, among others who presents for evaluation of a left knee wound.  Patient reports noticing a papule to the lateral aspect of his left knee approximately 2 weeks ago.  He states that this has increased in size, become more painful, and also developed purulent drainage, with purulence worsening over the last week.  He states that he is now having difficulty walking secondary to his pain.  He does note that he had a similar bump in the past and was prescribed Bactrim with good resolution.  He did have 1 dose of this left and took this dose this a.m.  Of note, the patient states that last night he measured his temperature to be 100.2F.  He took ibuprofen approximately 1 hour ago.    Allergies:  No Known Drug Allergies     Medications:   Aspirin 81 mg   Lipitor   Humulin  Prinivil   Glucophage     Past Medical History:    Diabetes mellitus  Hyperlipidemia  Hypertension   TIA    Tobacco abuse    Past Surgical History:    History reviewed. No pertinent past surgical history.     Family History:    Father - Diabetes  Mother - Diabetes, Hypertension    Social History:  The patient was unaccompanied to the ED.  Smoking Status: Current, 0.50 packs/day  Smokeless Tobacco: Never Used  Alcohol Use: No  Drug Use: No  PCP: [facility], [place]     Review of Systems   Constitutional: Positive for fever.   Skin: Positive for wound.   All other systems reviewed and are negative.      Physical Exam     Patient Vitals for the past 24 hrs:   BP Temp Temp src Pulse Heart Rate Resp SpO2 Weight   04/07/20 1638 -- -- -- -- -- -- 100 % --   04/07/20 1630 130/89 -- -- 75 -- -- -- --   04/07/20 1600 137/83 -- -- 78 -- -- -- --   04/07/20 1535 -- -- -- -- -- -- -- 84.4 kg (186 lb)   04/07/20 1524 -- -- -- -- -- -- 98 % --   04/07/20 1315 130/89 -- -- -- -- -- 99 % --   04/07/20 1310  130/89 98.3  F (36.8  C) Oral 96 96 18 100 % --     Physical Exam  Constitutional: Pleasant. Cooperative.   Eyes: Pupils equally round and reactive  HENT: Head is normal in appearance. Oropharynx is normal with moist mucus membranes.  Cardiovascular: Regular rate and rhythm and without murmurs.  Respiratory: Normal respiratory effort, lungs are clear bilaterally.  Musculoskeletal: No asymmetry of the lower extremities.  Skin: Wound to lateral aspect of left lower extremity. Marked tender erythema surrounding large area of hardened purulence. See picture below.  Neurologic: Cranial nerves grossly intact, normal cognition, no focal deficits. Alert and oriented x 3.   Psychiatric: Normal affect.  Nursing notes and vital signs reviewed.              Emergency Department Course   Imaging:  Radiology findings were communicated with the patient and Admitting MD who voiced understanding of the findings.    XR Knee Left 3 Views  IMPRESSION: Unremarkable examination.  Reading per radiology.      Laboratory:  Laboratory findings were communicated with the patient and Admitting MD who voiced understanding of the findings.    CBC: WBC 16.9 (H) o/w WNL (HGB 13.7, )   BMP: Glucose 295 (H) o/w WNL (Creatinine 1.22)   Lactic Acid (Collected at 1519): 1.1   CRP inflammation: 110.0 (H)  Erythrocyte sedimentation rate auto: 40 (H)  Blood Culture x2: Pending     Interventions:  1542 Rocephin 2 g IV    Emergency Department Course:  Past medical records, nursing notes, and vitals reviewed.  IV was inserted and blood was drawn for laboratory testing, results above.     (1325)   I performed an exam of the patient as documented above. History obtained from patient.     (3133)   I spoke with Dr. Oconnor of the Hospitalist service regarding patient's presentation, findings, and plan of care.     Findings and plan explained to the Patient who consents to admission. Discussed the patient with Dr. Oconnor, who will admit the patient to an  inpatient bed for further monitoring, evaluation, and treatment.  I personally reviewed the laboratory and imaging results with the Patient and answered all related questions prior to admission.     Impression & Plan   Medical Decision Making:  Kris Moeller Sr. is a 45 year old male who presents to the emergency department for evaluation of the left knee wound.  Patient is a diabetic and has had multiple wounds to his back and extremities in the past.  This one has been developing for the past 2 weeks.  He developed a fever last night, and his pain is increased such that he is having difficulty walking.  See HPI as above for additional details.  Vitals and physical exam as above.  Physical exam reveals abscess with surrounding cellulitis.  Patient has leukocytosis, however lactate is WNL. Inflammatory markers ordered as above for trending.  Blood cultures obtained prior to initiation of Rocephin as above. XR obtained without evidence for bony involvement. Discussed case with hospitalist who graciously accepted care of patient. All questions answered. Patient admitted in stable condition.    Diagnosis:    ICD-10-CM    1. Cellulitis of left thigh  L03.116 Erythrocyte sedimentation rate auto   2. Abscess of left thigh  L02.416    3. Fever in adult  R50.9      Disposition:  Admitted to an inpatient bed under the care of Dr. Oconnor.     Scribe Disclosure:  I, Varun Posada, am serving as a scribe at 1:18 PM on 4/7/2020 to document services personally performed by Casa Calvo PA-C based on my observations and the provider's statements to me.  April 7, 2020   [facility] EMERGENCY DEPARTMENT        Casa Calvo PA-C  04/07/20 1725

## 2020-04-07 NOTE — ED PROVIDER NOTES
Emergency Department Attending Supervision Note  4/7/2020  2:38 PM      I evaluated this patient in conjunction with Casa Calvo PA-C.     Briefly, the patient presented with a papule on the lateral aspect of his left knee that began to develop about 2 weeks ago. He notes a similar bump in the past, which cleared up with Bactrim, so he took a leftover Bactrim this morning. The patient has a history of diabetes, hypertension, hyperlipidemia and tobacco use.     On my exam,   General: Alert, appears well-developed and well-nourished. Cooperative.     In mild distress, Italian speaking  HEENT:  Head:  Atraumatic  Ears:  External ears are normal  Mouth/Throat:  Oropharynx is without erythema or exudate and mucous membranes are moist.   Eyes:   Conjunctivae normal and EOM are normal. No scleral icterus.  CV:  Normal rate, regular rhythm, normal heart sounds and radial pulses are 2+ and symmetric.  No murmur.  Resp:  Breath sounds are clear bilaterally    Non-labored, no retractions or accessory muscle use  GI:  Abdomen is soft, no distension, no tenderness. No rebound or guarding.  No CVA tenderness bilaterally  MS:  Normal range of motion. No edema.    There is swelling, induration and bulging to lateral aspect of distal left thigh ~8cm in largest diameter.  No left knee effusion.  No knee pain.     Normal strength in all 4 extremities.     Back atraumatic.    No midline cervical, thoracic, or lumbar tenderness  Skin:  Warm and dry.  Large abscess/cellulitis to left lateral aspect of distal thigh.    Neuro: Alert. Normal strength.  GCS: 15  Psych:  Normal mood and affect.        Results:  XR Knee Left 3 Views   Final Result   IMPRESSION: Unremarkable examination.       NATACHA MCLEAN MD        Labs Ordered and Resulted from Time of ED Arrival Up to the Time of Departure from the ED   CBC WITH PLATELETS DIFFERENTIAL - Abnormal; Notable for the following components:       Result Value    WBC 16.9 (*)     Absolute  Neutrophil 13.1 (*)     Absolute Monocytes 1.5 (*)     All other components within normal limits   BASIC METABOLIC PANEL - Abnormal; Notable for the following components:    Glucose 295 (*)     All other components within normal limits   CRP INFLAMMATION - Abnormal; Notable for the following components:    CRP Inflammation 110.0 (*)     All other components within normal limits   ERYTHROCYTE SEDIMENTATION RATE AUTO - Abnormal; Notable for the following components:    Sed Rate 40 (*)     All other components within normal limits   LACTIC ACID WHOLE BLOOD   NURSING DRAW AND HOLD       MDM:  Patient is a 45-year-old male with a history of diabetes who presents with cellulitis and suspected abscess of left distal thigh.  Patient does have systemic symptoms such as fever, as well as significant leukocytosis.  I do worry about his history of uncontrolled diabetes as his glucose appears greater than 200 here today. Patient will be started on antibiotics with plan for admission and potential consultation with orthopedic versus general surgery for potential surgical intervention as needed. I agree with the medical decision making and plan of care as described in Casa Calvo PA-C ED provider note.    Diagnosis    ICD-10-CM    1. Cellulitis of left thigh  L03.116 Blood culture     Blood culture     Erythrocyte sedimentation rate auto     Glucose by meter     Glucose by meter     Glucose by meter     Glucose by meter   2. Abscess of left thigh  L02.416    3. Fever in adult  R50.9          Willam Pemberton MD    I, Linh Ro, am serving as a scribe on 4/7/2020 at 2:40 PM to personally document services performed by Willam Pemberton MD based on my observations and the provider's statements to me.       4/7/2020   [facility] EMERGENCY DEPARTMENT       Willam Pemberton MD  04/08/20 0126

## 2020-04-08 ENCOUNTER — APPOINTMENT (OUTPATIENT)
Dept: MRI IMAGING | Facility: CLINIC | Age: 45
End: 2020-04-08
Attending: ORTHOPAEDIC SURGERY
Payer: MEDICAID

## 2020-04-08 ENCOUNTER — APPOINTMENT (OUTPATIENT)
Dept: INTERPRETER SERVICES | Facility: CLINIC | Age: 45
End: 2020-04-08
Payer: MEDICAID

## 2020-04-08 LAB
ANION GAP SERPL CALCULATED.3IONS-SCNC: 4 MMOL/L (ref 3–14)
BUN SERPL-MCNC: 21 MG/DL (ref 7–30)
CALCIUM SERPL-MCNC: 8.5 MG/DL (ref 8.5–10.1)
CHLORIDE SERPL-SCNC: 105 MMOL/L (ref 94–109)
CO2 SERPL-SCNC: 25 MMOL/L (ref 20–32)
CREAT SERPL-MCNC: 1.08 MG/DL (ref 0.66–1.25)
ERYTHROCYTE [DISTWIDTH] IN BLOOD BY AUTOMATED COUNT: 11.8 % (ref 10–15)
GFR SERPL CREATININE-BSD FRML MDRD: 82 ML/MIN/{1.73_M2}
GLUCOSE BLDC GLUCOMTR-MCNC: 202 MG/DL (ref 70–99)
GLUCOSE BLDC GLUCOMTR-MCNC: 203 MG/DL (ref 70–99)
GLUCOSE BLDC GLUCOMTR-MCNC: 203 MG/DL (ref 70–99)
GLUCOSE BLDC GLUCOMTR-MCNC: 236 MG/DL (ref 70–99)
GLUCOSE BLDC GLUCOMTR-MCNC: 257 MG/DL (ref 70–99)
GLUCOSE BLDC GLUCOMTR-MCNC: 302 MG/DL (ref 70–99)
GLUCOSE BLDC GLUCOMTR-MCNC: 309 MG/DL (ref 70–99)
GLUCOSE SERPL-MCNC: 220 MG/DL (ref 70–99)
HBA1C MFR BLD: 12.3 % (ref 0–5.6)
HCT VFR BLD AUTO: 38.8 % (ref 40–53)
HGB BLD-MCNC: 12.7 G/DL (ref 13.3–17.7)
MCH RBC QN AUTO: 28.8 PG (ref 26.5–33)
MCHC RBC AUTO-ENTMCNC: 32.7 G/DL (ref 31.5–36.5)
MCV RBC AUTO: 88 FL (ref 78–100)
PLATELET # BLD AUTO: 304 10E9/L (ref 150–450)
POTASSIUM SERPL-SCNC: 4.4 MMOL/L (ref 3.4–5.3)
RBC # BLD AUTO: 4.41 10E12/L (ref 4.4–5.9)
SODIUM SERPL-SCNC: 134 MMOL/L (ref 133–144)
WBC # BLD AUTO: 16.6 10E9/L (ref 4–11)

## 2020-04-08 PROCEDURE — 00000146 ZZHCL STATISTIC GLUCOSE BY METER IP

## 2020-04-08 PROCEDURE — 85027 COMPLETE CBC AUTOMATED: CPT | Performed by: STUDENT IN AN ORGANIZED HEALTH CARE EDUCATION/TRAINING PROGRAM

## 2020-04-08 PROCEDURE — 25800030 ZZH RX IP 258 OP 636: Performed by: STUDENT IN AN ORGANIZED HEALTH CARE EDUCATION/TRAINING PROGRAM

## 2020-04-08 PROCEDURE — 73723 MRI JOINT LWR EXTR W/O&W/DYE: CPT | Mod: LT

## 2020-04-08 PROCEDURE — 12000000 ZZH R&B MED SURG/OB

## 2020-04-08 PROCEDURE — 83036 HEMOGLOBIN GLYCOSYLATED A1C: CPT | Performed by: STUDENT IN AN ORGANIZED HEALTH CARE EDUCATION/TRAINING PROGRAM

## 2020-04-08 PROCEDURE — 25500064 ZZH RX 255 OP 636: Performed by: STUDENT IN AN ORGANIZED HEALTH CARE EDUCATION/TRAINING PROGRAM

## 2020-04-08 PROCEDURE — A9585 GADOBUTROL INJECTION: HCPCS | Performed by: STUDENT IN AN ORGANIZED HEALTH CARE EDUCATION/TRAINING PROGRAM

## 2020-04-08 PROCEDURE — 25000132 ZZH RX MED GY IP 250 OP 250 PS 637: Performed by: INTERNAL MEDICINE

## 2020-04-08 PROCEDURE — 36415 COLL VENOUS BLD VENIPUNCTURE: CPT | Performed by: STUDENT IN AN ORGANIZED HEALTH CARE EDUCATION/TRAINING PROGRAM

## 2020-04-08 PROCEDURE — 80048 BASIC METABOLIC PNL TOTAL CA: CPT | Performed by: STUDENT IN AN ORGANIZED HEALTH CARE EDUCATION/TRAINING PROGRAM

## 2020-04-08 PROCEDURE — 25000132 ZZH RX MED GY IP 250 OP 250 PS 637: Performed by: STUDENT IN AN ORGANIZED HEALTH CARE EDUCATION/TRAINING PROGRAM

## 2020-04-08 PROCEDURE — 25000128 H RX IP 250 OP 636: Performed by: STUDENT IN AN ORGANIZED HEALTH CARE EDUCATION/TRAINING PROGRAM

## 2020-04-08 PROCEDURE — 99233 SBSQ HOSP IP/OBS HIGH 50: CPT | Performed by: INTERNAL MEDICINE

## 2020-04-08 RX ORDER — CEFAZOLIN SODIUM 1 G/50ML
1750 SOLUTION INTRAVENOUS EVERY 12 HOURS
Status: DISCONTINUED | OUTPATIENT
Start: 2020-04-08 | End: 2020-04-11

## 2020-04-08 RX ORDER — GADOBUTROL 604.72 MG/ML
7.5 INJECTION INTRAVENOUS ONCE
Status: COMPLETED | OUTPATIENT
Start: 2020-04-08 | End: 2020-04-08

## 2020-04-08 RX ORDER — OXYCODONE HYDROCHLORIDE 5 MG/1
5-10 TABLET ORAL EVERY 4 HOURS PRN
Status: DISCONTINUED | OUTPATIENT
Start: 2020-04-08 | End: 2020-04-11

## 2020-04-08 RX ORDER — OXYCODONE HYDROCHLORIDE 5 MG/1
5 TABLET ORAL
Status: COMPLETED | OUTPATIENT
Start: 2020-04-08 | End: 2020-04-08

## 2020-04-08 RX ORDER — OXYCODONE HYDROCHLORIDE 5 MG/1
5 TABLET ORAL EVERY 4 HOURS PRN
Status: DISCONTINUED | OUTPATIENT
Start: 2020-04-08 | End: 2020-04-08

## 2020-04-08 RX ADMIN — CEFTRIAXONE 1 G: 1 INJECTION, POWDER, FOR SOLUTION INTRAMUSCULAR; INTRAVENOUS at 17:31

## 2020-04-08 RX ADMIN — VANCOMYCIN HYDROCHLORIDE 1750 MG: 10 INJECTION, POWDER, LYOPHILIZED, FOR SOLUTION INTRAVENOUS at 13:07

## 2020-04-08 RX ADMIN — INSULIN ASPART 4 UNITS: 100 INJECTION, SOLUTION INTRAVENOUS; SUBCUTANEOUS at 17:32

## 2020-04-08 RX ADMIN — ACETAMINOPHEN 650 MG: 325 TABLET, FILM COATED ORAL at 14:01

## 2020-04-08 RX ADMIN — OXYCODONE HYDROCHLORIDE 5 MG: 5 TABLET ORAL at 22:39

## 2020-04-08 RX ADMIN — ASPIRIN 81 MG: 81 TABLET, COATED ORAL at 09:09

## 2020-04-08 RX ADMIN — OXYCODONE HYDROCHLORIDE 5 MG: 5 TABLET ORAL at 17:40

## 2020-04-08 RX ADMIN — GADOBUTROL 7.5 ML: 604.72 INJECTION INTRAVENOUS at 16:34

## 2020-04-08 RX ADMIN — SODIUM CHLORIDE, POTASSIUM CHLORIDE, SODIUM LACTATE AND CALCIUM CHLORIDE: 600; 310; 30; 20 INJECTION, SOLUTION INTRAVENOUS at 04:01

## 2020-04-08 RX ADMIN — OXYCODONE HYDROCHLORIDE 5 MG: 5 TABLET ORAL at 01:32

## 2020-04-08 RX ADMIN — INSULIN ASPART 2 UNITS: 100 INJECTION, SOLUTION INTRAVENOUS; SUBCUTANEOUS at 09:09

## 2020-04-08 ASSESSMENT — ACTIVITIES OF DAILY LIVING (ADL)
ADLS_ACUITY_SCORE: 11

## 2020-04-08 ASSESSMENT — MIFFLIN-ST. JEOR: SCORE: 1655.11

## 2020-04-08 NOTE — PROVIDER NOTIFICATION
REASON FOR CONTACT: pt is rating pain 9/10 after BRPs. Can he get his Oxycodone changed to every 4 hours instead of every 6, please?   PROVIDER CONTACTED: admitting hospitalist   TIME CONTACTED: now  MODE OF CONTACT: wbt

## 2020-04-08 NOTE — CONSULTS
[facility]    Orthopedic Consultation    Kris Moeller Sr. MRN# [id number]   Age: 45 year old YOB: 1975     Date of Admission:  4/7/2020    Reason for consult: Left posteriorlateral thigh infection       Requesting physician: Mandeep Oconnor       Level of consult: Consult, follow and place orders           Assessment and Plan:   Assessment:   Likely soft tissue abscess/infection left lateral thigh.  No evidence of knee joint involvement        Plan:   Recommend MRI of the thigh/knee to assess for abscess.  Possible I and D if needed.  May eat today, NPO after midnight.           Chief Complaint:   Left lateral thigh pain and swelling.         History of Present Illness:   This patient is a 45 year old male who presents with the following condition requiring a hospital admission:    Kris Moeller Sr. is a 45 year old Albanian speaking gentleman from [place] with past medical history of type 2 diabetes mellitus, tobacco abuse, hypertension, hyperlipidemia who presents for evaluation of left knee pain/swelling.     Patient reports that for the last 3 weeks, ago he developed a pustule on his lateral left thigh.  He was prescribed bactrim and seemed to improve, however of the last week after stopping the bactrim the  he is noticed what was a small papule on the lateral aspect of his left knee expand to become significantly more painful having increasing purulent drainage.  He also reports of a fever of 100.6  F.  He does endorse some chills.  He denies any recent trauma.  He reports that the pain has made it difficult for him to ambulate over the past week.  He reports that he has had an infection in that spot in the past before, resolved with Bactrim.  He did take a dose of Bactrim this a.m. from a prior prescription.  He took ibuprofen with minimal pain relief.  He otherwise denies any calf pain or leg swelling.  He denies any nausea/vomiting abdominal pain.  He denies any PND orthopnea.  He denies any  chest pain or shortness of breath.  He denies no cough.  He denies any recent travel.  He otherwise has no other complaints this time.  He denies any puncture wounds in the past 2 weeks.             Past Medical History:     Past Medical History:   Diagnosis Date     Diabetes mellitus (H)      Hyperlipidemia      Hypertension      TIA (transient ischemic attack)              Past Surgical History:   History reviewed. No pertinent surgical history.          Social History:     Social History     Tobacco Use     Smoking status: Current Every Day Smoker     Packs/day: 0.50     Smokeless tobacco: Never Used   Substance Use Topics     Alcohol use: No             Family History:   History reviewed. No pertinent family history.          Immunizations:     VACCINE/DOSE   Diptheria   DPT   DTAP   HBIG   Hepatitis A   Hepatitis B   HIB   Influenza   Measles   Meningococcal   MMR   Mumps   Pneumococcal   Polio   Rubella   Small Pox   TDAP   Varicella   Zoster             Allergies:   No Known Allergies          Medications:     Current Facility-Administered Medications   Medication     acetaminophen (TYLENOL) tablet 650 mg     aspirin EC tablet 81 mg     cefTRIAXone (ROCEPHIN) 1 g vial to attach to  mL bag for ADULTS or NS 50 mL bag for PEDS     glucose gel 15-30 g    Or     dextrose 50 % injection 25-50 mL    Or     glucagon injection 1 mg     insulin aspart (NovoLOG) injection (RAPID ACTING)     insulin aspart (NovoLOG) injection (RAPID ACTING)     insulin detemir (LEVEMIR PEN) injection 30 Units     lidocaine (LMX4) cream     lidocaine 1 % 0.1-1 mL     melatonin tablet 1 mg     naloxone (NARCAN) injection 0.1-0.4 mg     ondansetron (ZOFRAN-ODT) ODT tab 4 mg    Or     ondansetron (ZOFRAN) injection 4 mg     oxyCODONE (ROXICODONE) tablet 5-10 mg     prochlorperazine (COMPAZINE) injection 10 mg    Or     prochlorperazine (COMPAZINE) tablet 10 mg    Or     prochlorperazine (COMPAZINE) Suppository 25 mg     sodium chloride  "(PF) 0.9% PF flush 3 mL     sodium chloride (PF) 0.9% PF flush 3 mL             Review of Systems:   CV: NEGATIVE for chest pain, palpitations or peripheral edema  C: NEGATIVE for fever, chills, change in weight  E/M: NEGATIVE for ear, mouth and throat problems  R: NEGATIVE for significant cough or SOB          Physical Exam:   All vitals have been reviewed  Patient Vitals for the past 24 hrs:   BP Temp Temp src Pulse Heart Rate Resp SpO2 Height Weight   04/08/20 0739 127/71 100  F (37.8  C) Temporal -- 99 16 97 % -- --   04/08/20 0500 -- -- -- -- -- -- -- -- 84.8 kg (187 lb)   04/08/20 0100 101/61 99.7  F (37.6  C) Temporal -- -- 18 -- -- --   04/07/20 1945 125/77 98.7  F (37.1  C) Oral -- 96 16 100 % -- --   04/07/20 1749 (!) 157/83 97.7  F (36.5  C) Temporal 84 -- 16 99 % 1.651 m (5' 5\") 83.9 kg (185 lb)   04/07/20 1730 (!) 149/82 -- -- 74 -- -- 100 % -- --   04/07/20 1700 124/86 -- -- 78 -- -- 100 % -- --   04/07/20 1638 -- -- -- -- -- -- 100 % -- --   04/07/20 1630 130/89 -- -- 75 -- -- -- -- --   04/07/20 1600 137/83 -- -- 78 -- -- -- -- --   04/07/20 1535 -- -- -- -- -- -- -- -- 84.4 kg (186 lb)   04/07/20 1524 -- -- -- -- -- -- 98 % -- --   04/07/20 1315 130/89 -- -- -- -- -- 99 % -- --   04/07/20 1310 130/89 98.3  F (36.8  C) Oral 96 96 18 100 % -- --       Intake/Output Summary (Last 24 hours) at 4/8/2020 1108  Last data filed at 4/8/2020 0100  Gross per 24 hour   Intake 698 ml   Output 1100 ml   Net -402 ml     Musculoskeletal:   no lower extremity pitting edema present  there is no redness, warmth, or swelling of the joints particlualry his left knee  full range of motion noted  motor strength is 5 out of 5 all extremities bilaterally  tone is normal  There is an quarter sized eschar on the lateral posterior thigh just proximal to the knee joint and over the ITB.  He is very tender here with some fluctuance.  Mild redness.  The knee is benign.  Constitutional: Pleasant, alert, appropriate, following " commands.  HEENT: Head atraumatic normocephalic. PERRLA.  Respiratory: Unlabored breathing no audible wheeze  Cardiovascular: Regular rate and rhythm  GI: Abdomen soft, non tender, and non distended.  Lymph/Hematologic: No edema or palor  Skin: No rashes, no cyanosis, no edema.  Neuro: Sensation intact to light touch in bilateral lower extremities.                 Data:   All laboratory data reviewed  Results for orders placed or performed during the hospital encounter of 04/07/20   XR Knee Left 3 Views     Status: None    Narrative    LEFT KNEE THREE VIEWS 4/7/2020 3:29 PM    HISTORY: Cellulitis and abscess to left lateral knee. No knee effusion    COMPARISON: None.    FINDINGS: No fracture or osseous lesion is seen. The joint spaces are  well preserved. No soft tissue pathology is seen.       Impression    IMPRESSION: Unremarkable examination.     NATACHA MCLEAN MD   CBC with platelets differential     Status: Abnormal   Result Value Ref Range    WBC 16.9 (H) 4.0 - 11.0 10e9/L    RBC Count 4.71 4.4 - 5.9 10e12/L    Hemoglobin 13.7 13.3 - 17.7 g/dL    Hematocrit 40.8 40.0 - 53.0 %    MCV 87 78 - 100 fl    MCH 29.1 26.5 - 33.0 pg    MCHC 33.6 31.5 - 36.5 g/dL    RDW 11.7 10.0 - 15.0 %    Platelet Count 323 150 - 450 10e9/L    Diff Method Automated Method     % Neutrophils 77.5 %    % Lymphocytes 12.4 %    % Monocytes 8.6 %    % Eosinophils 0.7 %    % Basophils 0.2 %    % Immature Granulocytes 0.6 %    Nucleated RBCs 0 0 /100    Absolute Neutrophil 13.1 (H) 1.6 - 8.3 10e9/L    Absolute Lymphocytes 2.1 0.8 - 5.3 10e9/L    Absolute Monocytes 1.5 (H) 0.0 - 1.3 10e9/L    Absolute Eosinophils 0.1 0.0 - 0.7 10e9/L    Absolute Basophils 0.0 0.0 - 0.2 10e9/L    Abs Immature Granulocytes 0.1 0 - 0.4 10e9/L    Absolute Nucleated RBC 0.0    Basic metabolic panel     Status: Abnormal   Result Value Ref Range    Sodium 133 133 - 144 mmol/L    Potassium 4.3 3.4 - 5.3 mmol/L    Chloride 103 94 - 109 mmol/L    Carbon Dioxide 27  20 - 32 mmol/L    Anion Gap 3 3 - 14 mmol/L    Glucose 295 (H) 70 - 99 mg/dL    Urea Nitrogen 26 7 - 30 mg/dL    Creatinine 1.22 0.66 - 1.25 mg/dL    GFR Estimate 71 >60 mL/min/[1.73_m2]    GFR Estimate If Black 82 >60 mL/min/[1.73_m2]    Calcium 8.5 8.5 - 10.1 mg/dL   Lactic acid whole blood     Status: None   Result Value Ref Range    Lactic Acid 1.1 0.7 - 2.0 mmol/L   CRP inflammation     Status: Abnormal   Result Value Ref Range    CRP Inflammation 110.0 (H) 0.0 - 8.0 mg/L   Erythrocyte sedimentation rate auto     Status: Abnormal   Result Value Ref Range    Sed Rate 40 (H) 0 - 15 mm/h   Glucose by meter     Status: Abnormal   Result Value Ref Range    Glucose 244 (H) 70 - 99 mg/dL   Glucose by meter     Status: Abnormal   Result Value Ref Range    Glucose 316 (H) 70 - 99 mg/dL   Basic metabolic panel     Status: Abnormal   Result Value Ref Range    Sodium 134 133 - 144 mmol/L    Potassium 4.4 3.4 - 5.3 mmol/L    Chloride 105 94 - 109 mmol/L    Carbon Dioxide 25 20 - 32 mmol/L    Anion Gap 4 3 - 14 mmol/L    Glucose 220 (H) 70 - 99 mg/dL    Urea Nitrogen 21 7 - 30 mg/dL    Creatinine 1.08 0.66 - 1.25 mg/dL    GFR Estimate 82 >60 mL/min/[1.73_m2]    GFR Estimate If Black >90 >60 mL/min/[1.73_m2]    Calcium 8.5 8.5 - 10.1 mg/dL   CBC with platelets     Status: Abnormal   Result Value Ref Range    WBC 16.6 (H) 4.0 - 11.0 10e9/L    RBC Count 4.41 4.4 - 5.9 10e12/L    Hemoglobin 12.7 (L) 13.3 - 17.7 g/dL    Hematocrit 38.8 (L) 40.0 - 53.0 %    MCV 88 78 - 100 fl    MCH 28.8 26.5 - 33.0 pg    MCHC 32.7 31.5 - 36.5 g/dL    RDW 11.8 10.0 - 15.0 %    Platelet Count 304 150 - 450 10e9/L   Hemoglobin A1c     Status: Abnormal   Result Value Ref Range    Hemoglobin A1C 12.3 (H) 0 - 5.6 %   Glucose by meter     Status: Abnormal   Result Value Ref Range    Glucose 309 (H) 70 - 99 mg/dL   Glucose by meter     Status: Abnormal   Result Value Ref Range    Glucose 257 (H) 70 - 99 mg/dL   Glucose by meter     Status: Abnormal    Result Value Ref Range    Glucose 203 (H) 70 - 99 mg/dL   Blood culture     Status: None (Preliminary result)    Specimen: Blood    Left Arm   Result Value Ref Range    Specimen Description Blood Left Arm     Culture Micro No growth after 12 hours    Blood culture     Status: None (Preliminary result)    Specimen: Blood   Result Value Ref Range    Specimen Description Blood     Special Requests Right Arm     Culture Micro No growth after 12 hours           Attestation:  I have reviewed today's vital signs, notes, medications, labs and imaging.  Amount of time performed on this consult: 15 minutes.    Claudy Wagner MD

## 2020-04-08 NOTE — CONSULTS
Consult Date:  04/08/2020      INFECTIOUS DISEASE CONSULTATION      LOCATION:  Room 625      REQUESTING PHYSICIAN:  Dr. Li      IMPRESSION:   1.  A 45-year-old male with subacute worsening left knee area abscess, mild systemic sepsis with cultures pending.   2.  Prior history of some recurrent skin lesions treated with Bactrim in the past, but no history of MRSA.   3.  Poorly controlled diabetes mellitus.      RECOMMENDATIONS:   1.  Discussed with Dr. Wagner.  He is getting an MRI scan to assess how deep abscess goes, but this needs an I and D, even if no abscess seen on MRI.   2.  Ceftriaxone reasonable.  We will also add vancomycin.  This is likely Staph aureus and certainly possible MRSA, although he has not had that historically.  Amount of antibiotics depending on I and D findings.  Possibly oral therapy relatively early postop.      HISTORY OF PRESENT ILLNESS:  This 45-year-old male is seen in consultation due to a left leg abscess.  The patient had onset of this over the last week.  Had a dose of Bactrim at home, took it, but then had some fevers and worsening.  Some minor drainage has occurred.  The patient has been picking at it.  There is a necrotic-looking wound over it as well.  It is not involving the knee joint nor the bursa itself.      PAST MEDICAL HISTORY:  Prior skin lesions treated with Bactrim in the past.  No known MRSA, although looking back in the records, had sensitive Staph aureus 8 years ago in wd culture.      SOCIAL AND FAMILY HISTORY:  No recent travel exposures.  Speaks Lithuanian.      REVIEW OF SYSTEMS:  Considerable pain in the area.  Some mild systemic symptoms.      PHYSICAL EXAMINATION:   GENERAL:  The patient appears his stated age.  Does not look that toxic or ill, although earlier temperature of 100 and tachycardic at 100.   HEENT:  No thrush or intraoral lesions.  Pupils reactive.   NECK:  Supple, nontender.   HEART:  Unremarkable.   LUNGS:  Unremarkable.   ABDOMEN:  Soft and  nontender.   EXTREMITIES:  Fluctuant-looking area lateral to the knee, not involving the joint itself or the bursa.  Could not elicit any pus from it.  There is a necrotic central wound area.  Mild cellulitis surrounding the area.      LABORATORY:  White count 16,000.  Cultures all pending.      Thank you very much for consultation.  I will follow the patient with you.         CLARISSE RUTLEDGE MD             D: 2020   T: 2020   MT: LLOYD      Name:     DANIELA JOSEPH   MRN:      [id number]        Account:       [id number]   :      1975           Consult Date:  2020      Document: [id number]

## 2020-04-08 NOTE — PLAN OF CARE
Pt is up independently in room .  Pt has an  phone in room.  Pt does speak english and understands some english. Pts left knee is marked the red has decreased. Pt has denied pain this shift. Pt will have a MRI .

## 2020-04-08 NOTE — PROVIDER NOTIFICATION
REASON FOR CONTACT: Blood sugar= 309  PROVIDER CONTACTED: admitting hospitalist   TIME CONTACTED: now  MODE OF CONTACT: wbt

## 2020-04-08 NOTE — CONSULTS
ID consult dictated IMP 1 44 yo male abscess L lateral leg    REcc Dr Wagner assessing for likely Iand D , add vanco pending cxs

## 2020-04-08 NOTE — PROGRESS NOTES
[facility]  Hospitalist Progress Note  Ken Li MD 04/08/20    Reason for Stay (Diagnosis): left leg cellulitis         Assessment and Plan:      Summary of Stay: Kris Moeller Sr. is a 45 year old male with history of poorly controlled IDDM type II and HLD who was admitted on 4/7/2020 with left lateral upper leg pain, erythema, swelling, and drainage from a pustule that he picked it consistent with cellulitis and possible abscess.  Febrile to 100  F.  Leukocytosis at 16.9 and CRP elevated at 110/ESR 40.  Started on IV ceftriaxone.  ID consulted and added IV vancomycin.  Orthopedic surgery consulted and recommending MRI of the left thigh to evaluate for abscess.  N.p.o. after midnight in case of need for I&D tomorrow.    Problem List/Assessment and Plan:   Left lateral thigh cellulitis, possible abscess: Patient had a small pustule on the lateral aspect of the thigh near his knee which he picked at 2 weeks ago.  This became red and swollen and has drained since then.  Now has an eschar over it.  Fevers at home along with chills.  Fever here at 100.0  F.  Leukocytosis of 16.9 elevated inflammatory markers with /ESR 40.  -ID consulted, IV vancomycin added to IV ceftriaxone  -Orthopedics consulted, recommend MRI of the left thigh to evaluate for possible abscess.  N.p.o. at midnight in case of I&D needed tomorrow  -Acetaminophen and oxycodone as needed for pain control  -Elevate left leg  -Blood cultures NGTD    Poorly controlled IDDM type II: PTA on Levemir 45 units at bedtime along with aspart 15 units twice daily with meals and metformin 1000 mg twice daily with meals.  Also takes 81 mg aspirin.  Poorly controlled in the past.  A1c is 12.3  -Metformin held due to acute infection  -Levemir dose reduced to 30 units at bedtime given n.p.o. status at midnight, remains hyperglycemic so increased to 35 units at bedtime  -Medium dose sliding scale insulin    HLD: Not currently on any medications  "for this, follow-up with PCP.      DVT Prophylaxis: Pneumatic Compression Devices  Code Status: Full Code  FEN: Consistent carbohydrate moderate, n.p.o. midnight for possible I&D  Discharge Dispo: Home  Estimated Disch Date / # of Days until Disch: 1-2 days pending need for I&D or not        Interval History (Subjective):      Assumed care today.  Admitted for cellulitis.  He says the redness looks better as does the swelling.  Has not had any drainage overnight from the left leg wound.  Febrile to 100 point degrees Fahrenheit.  Denies any chills this morning.                  Physical Exam:      Last Vital Signs:  /71 (BP Location: Right arm)   Pulse 84   Temp 100  F (37.8  C) (Temporal)   Resp 16   Ht 1.651 m (5' 5\")   Wt 84.8 kg (187 lb)   SpO2 97%   BMI 31.12 kg/m        Intake/Output Summary (Last 24 hours) at 4/8/2020 1221  Last data filed at 4/8/2020 0100  Gross per 24 hour   Intake 698 ml   Output 1100 ml   Net -402 ml       Constitutional: Awake, NAD   Eyes: sclera white   HEENT:  MMM  Respiratory:  lungs cta bilaterally, no crackles or wheeze  Cardiovascular: RRR.  No murmur   GI: non-tender, not distended, bowel sounds present  Skin/Musculoskeletal/extremities: Small area of erythema to the left lateral lower thigh near the knee.  Eschar in this area.  This area is thickened, but not significantly warm.  No obvious fluctuance.  Neurologic: A&O   Psychiatric: calm, cooperative          Medications:      All current medications were reviewed with changes reflected in problem list.         Data:      All new lab and imaging data was reviewed.   Labs:  Recent Labs   Lab 04/07/20  1518 04/07/20  1323   CULT No growth after 12 hours No growth after 12 hours     Recent Labs   Lab 04/08/20  0659 04/07/20  1323    133   POTASSIUM 4.4 4.3   CHLORIDE 105 103   CO2 25 27   ANIONGAP 4 3   * 295*   BUN 21 26   CR 1.08 1.22   GFRESTIMATED 82 71   GFRESTBLACK >90 82   KHALIDA 8.5 8.5     Recent Labs "   Lab 04/08/20  0659 04/07/20  1323   WBC 16.6* 16.9*   HGB 12.7* 13.7   HCT 38.8* 40.8   MCV 88 87    323      Imaging:   MRI of the left lateral thigh to be obtained      Ken Li MD

## 2020-04-08 NOTE — PHARMACY-VANCOMYCIN DOSING SERVICE
Pharmacy Vancomycin Initial Note  Date of Service 2020  Patient's  1975  45 year old, male    Indication: Abscess (diabetic pt)    Current estimated CrCl = Estimated Creatinine Clearance: 85.6 mL/min (based on SCr of 1.08 mg/dL).    Creatinine for last 3 days  2020:  1:23 PM Creatinine 1.22 mg/dL  2020:  6:59 AM Creatinine 1.08 mg/dL    Recent Vancomycin Level(s) for last 3 days  No results found for requested labs within last 72 hours.      Vancomycin IV Administrations (past 72 hours)      No vancomycin orders with administrations in past 72 hours.                Nephrotoxins and other renal medications (From now, onward)    Start     Dose/Rate Route Frequency Ordered Stop    20 1200  vancomycin (VANCOCIN) 1,750 mg in sodium chloride 0.9 % 500 mL intermittent infusion      1,750 mg  over 2 Hours Intravenous EVERY 12 HOURS 20 1156            Contrast Orders - past 72 hours (72h ago, onward)    None              Plan:  1.  Start vancomycin  1750 mg IV q12h.   2.  Goal Trough Level: 15-20 mg/L   3.  Pharmacy will check trough levels as appropriate in 1-3 Days.    4. Serum creatinine levels will be ordered daily for the first week of therapy and at least twice weekly for subsequent weeks.    5. [place] method utilized to dose vancomycin therapy: Method 2.    Selene Lynne [facility]

## 2020-04-08 NOTE — PLAN OF CARE
A&O x 4, SBA, mod carb diet, , insulin given, IV fluids infusing, pt c/o left leg pain, oxy given, will continue with POC.

## 2020-04-08 NOTE — PROGRESS NOTES
X-cover    Patient with increased and persistent LE pain after getting up to go to the bathroom.  Request is to increase oxy to q4 hours prn.  Ordered one time 5 mg dose prn    X-cover    Called for , received long acting insulin last night.  Give aspart 7 unit(s) x 1, recheck in 2 hours, call if > 250 or < 100

## 2020-04-09 ENCOUNTER — ANESTHESIA EVENT (OUTPATIENT)
Dept: SURGERY | Facility: CLINIC | Age: 45
End: 2020-04-09
Payer: MEDICAID

## 2020-04-09 ENCOUNTER — ANESTHESIA (OUTPATIENT)
Dept: SURGERY | Facility: CLINIC | Age: 45
End: 2020-04-09
Payer: MEDICAID

## 2020-04-09 ENCOUNTER — APPOINTMENT (OUTPATIENT)
Dept: INTERPRETER SERVICES | Facility: CLINIC | Age: 45
End: 2020-04-09
Payer: MEDICAID

## 2020-04-09 PROBLEM — L02.416 ABSCESS OF LEFT THIGH: Status: ACTIVE | Noted: 2020-04-09

## 2020-04-09 LAB
ANION GAP SERPL CALCULATED.3IONS-SCNC: 4 MMOL/L (ref 3–14)
BUN SERPL-MCNC: 19 MG/DL (ref 7–30)
CALCIUM SERPL-MCNC: 8.5 MG/DL (ref 8.5–10.1)
CHLORIDE SERPL-SCNC: 104 MMOL/L (ref 94–109)
CO2 SERPL-SCNC: 27 MMOL/L (ref 20–32)
CREAT SERPL-MCNC: 1 MG/DL (ref 0.66–1.25)
ERYTHROCYTE [DISTWIDTH] IN BLOOD BY AUTOMATED COUNT: 11.6 % (ref 10–15)
GFR SERPL CREATININE-BSD FRML MDRD: 90 ML/MIN/{1.73_M2}
GLUCOSE BLDC GLUCOMTR-MCNC: 125 MG/DL (ref 70–99)
GLUCOSE BLDC GLUCOMTR-MCNC: 134 MG/DL (ref 70–99)
GLUCOSE BLDC GLUCOMTR-MCNC: 174 MG/DL (ref 70–99)
GLUCOSE BLDC GLUCOMTR-MCNC: 193 MG/DL (ref 70–99)
GLUCOSE BLDC GLUCOMTR-MCNC: 240 MG/DL (ref 70–99)
GLUCOSE BLDC GLUCOMTR-MCNC: 261 MG/DL (ref 70–99)
GLUCOSE BLDC GLUCOMTR-MCNC: 303 MG/DL (ref 70–99)
GLUCOSE SERPL-MCNC: 154 MG/DL (ref 70–99)
GRAM STN SPEC: ABNORMAL
GRAM STN SPEC: ABNORMAL
HCT VFR BLD AUTO: 36.6 % (ref 40–53)
HGB BLD-MCNC: 11.8 G/DL (ref 13.3–17.7)
LACTATE BLD-SCNC: 0.9 MMOL/L (ref 0.7–2)
Lab: ABNORMAL
MCH RBC QN AUTO: 28 PG (ref 26.5–33)
MCHC RBC AUTO-ENTMCNC: 32.2 G/DL (ref 31.5–36.5)
MCV RBC AUTO: 87 FL (ref 78–100)
PLATELET # BLD AUTO: 312 10E9/L (ref 150–450)
POTASSIUM SERPL-SCNC: 3.8 MMOL/L (ref 3.4–5.3)
RBC # BLD AUTO: 4.21 10E12/L (ref 4.4–5.9)
SODIUM SERPL-SCNC: 135 MMOL/L (ref 133–144)
SPECIMEN SOURCE: ABNORMAL
WBC # BLD AUTO: 17 10E9/L (ref 4–11)

## 2020-04-09 PROCEDURE — 25000128 H RX IP 250 OP 636: Performed by: NURSE ANESTHETIST, CERTIFIED REGISTERED

## 2020-04-09 PROCEDURE — 27210794 ZZH OR GENERAL SUPPLY STERILE: Performed by: ORTHOPAEDIC SURGERY

## 2020-04-09 PROCEDURE — 71000014 ZZH RECOVERY PHASE 1 LEVEL 2 FIRST HR: Performed by: ORTHOPAEDIC SURGERY

## 2020-04-09 PROCEDURE — 80048 BASIC METABOLIC PNL TOTAL CA: CPT | Performed by: INTERNAL MEDICINE

## 2020-04-09 PROCEDURE — 25000125 ZZHC RX 250: Performed by: ORTHOPAEDIC SURGERY

## 2020-04-09 PROCEDURE — 25000128 H RX IP 250 OP 636: Performed by: STUDENT IN AN ORGANIZED HEALTH CARE EDUCATION/TRAINING PROGRAM

## 2020-04-09 PROCEDURE — 25000132 ZZH RX MED GY IP 250 OP 250 PS 637: Performed by: PHYSICIAN ASSISTANT

## 2020-04-09 PROCEDURE — 40000275 ZZH STATISTIC RCP TIME EA 10 MIN

## 2020-04-09 PROCEDURE — 87186 SC STD MICRODIL/AGAR DIL: CPT | Performed by: ORTHOPAEDIC SURGERY

## 2020-04-09 PROCEDURE — 83605 ASSAY OF LACTIC ACID: CPT | Performed by: INTERNAL MEDICINE

## 2020-04-09 PROCEDURE — 25000128 H RX IP 250 OP 636: Performed by: ORTHOPAEDIC SURGERY

## 2020-04-09 PROCEDURE — 40000306 ZZH STATISTIC PRE PROC ASSESS II: Performed by: ORTHOPAEDIC SURGERY

## 2020-04-09 PROCEDURE — 87070 CULTURE OTHR SPECIMN AEROBIC: CPT | Performed by: ORTHOPAEDIC SURGERY

## 2020-04-09 PROCEDURE — 25800030 ZZH RX IP 258 OP 636: Performed by: ANESTHESIOLOGY

## 2020-04-09 PROCEDURE — 37000009 ZZH ANESTHESIA TECHNICAL FEE, EACH ADDTL 15 MIN: Performed by: ORTHOPAEDIC SURGERY

## 2020-04-09 PROCEDURE — 99233 SBSQ HOSP IP/OBS HIGH 50: CPT | Performed by: INTERNAL MEDICINE

## 2020-04-09 PROCEDURE — 87075 CULTR BACTERIA EXCEPT BLOOD: CPT | Performed by: ORTHOPAEDIC SURGERY

## 2020-04-09 PROCEDURE — 87077 CULTURE AEROBIC IDENTIFY: CPT | Performed by: ORTHOPAEDIC SURGERY

## 2020-04-09 PROCEDURE — 25000132 ZZH RX MED GY IP 250 OP 250 PS 637: Performed by: STUDENT IN AN ORGANIZED HEALTH CARE EDUCATION/TRAINING PROGRAM

## 2020-04-09 PROCEDURE — 37000008 ZZH ANESTHESIA TECHNICAL FEE, 1ST 30 MIN: Performed by: ORTHOPAEDIC SURGERY

## 2020-04-09 PROCEDURE — 87205 SMEAR GRAM STAIN: CPT | Performed by: ORTHOPAEDIC SURGERY

## 2020-04-09 PROCEDURE — 25000132 ZZH RX MED GY IP 250 OP 250 PS 637: Performed by: INTERNAL MEDICINE

## 2020-04-09 PROCEDURE — 36415 COLL VENOUS BLD VENIPUNCTURE: CPT | Performed by: INTERNAL MEDICINE

## 2020-04-09 PROCEDURE — 12000000 ZZH R&B MED SURG/OB

## 2020-04-09 PROCEDURE — 25000125 ZZHC RX 250: Performed by: NURSE ANESTHETIST, CERTIFIED REGISTERED

## 2020-04-09 PROCEDURE — 25800030 ZZH RX IP 258 OP 636: Performed by: STUDENT IN AN ORGANIZED HEALTH CARE EDUCATION/TRAINING PROGRAM

## 2020-04-09 PROCEDURE — 93010 ELECTROCARDIOGRAM REPORT: CPT | Performed by: INTERNAL MEDICINE

## 2020-04-09 PROCEDURE — 36415 COLL VENOUS BLD VENIPUNCTURE: CPT | Performed by: STUDENT IN AN ORGANIZED HEALTH CARE EDUCATION/TRAINING PROGRAM

## 2020-04-09 PROCEDURE — 36000052 ZZH SURGERY LEVEL 2 EA 15 ADDTL MIN: Performed by: ORTHOPAEDIC SURGERY

## 2020-04-09 PROCEDURE — 93005 ELECTROCARDIOGRAM TRACING: CPT

## 2020-04-09 PROCEDURE — 00000146 ZZHCL STATISTIC GLUCOSE BY METER IP

## 2020-04-09 PROCEDURE — 80202 ASSAY OF VANCOMYCIN: CPT | Performed by: STUDENT IN AN ORGANIZED HEALTH CARE EDUCATION/TRAINING PROGRAM

## 2020-04-09 PROCEDURE — 85027 COMPLETE CBC AUTOMATED: CPT | Performed by: INTERNAL MEDICINE

## 2020-04-09 PROCEDURE — 0JBM0ZZ EXCISION OF LEFT UPPER LEG SUBCUTANEOUS TISSUE AND FASCIA, OPEN APPROACH: ICD-10-PCS | Performed by: ORTHOPAEDIC SURGERY

## 2020-04-09 PROCEDURE — 36000050 ZZH SURGERY LEVEL 2 1ST 30 MIN: Performed by: ORTHOPAEDIC SURGERY

## 2020-04-09 PROCEDURE — 25800030 ZZH RX IP 258 OP 636: Performed by: NURSE ANESTHETIST, CERTIFIED REGISTERED

## 2020-04-09 PROCEDURE — 25800030 ZZH RX IP 258 OP 636: Performed by: PHYSICIAN ASSISTANT

## 2020-04-09 PROCEDURE — 25000128 H RX IP 250 OP 636: Performed by: ANESTHESIOLOGY

## 2020-04-09 RX ORDER — NAPROXEN 500 MG/1
500 TABLET ORAL EVERY 12 HOURS PRN
Status: DISCONTINUED | OUTPATIENT
Start: 2020-04-09 | End: 2020-04-11

## 2020-04-09 RX ORDER — FENTANYL CITRATE 50 UG/ML
INJECTION, SOLUTION INTRAMUSCULAR; INTRAVENOUS PRN
Status: DISCONTINUED | OUTPATIENT
Start: 2020-04-09 | End: 2020-04-09

## 2020-04-09 RX ORDER — CEFAZOLIN SODIUM 2 G/100ML
2 INJECTION, SOLUTION INTRAVENOUS
Status: COMPLETED | OUTPATIENT
Start: 2020-04-09 | End: 2020-04-09

## 2020-04-09 RX ORDER — ACETAMINOPHEN 325 MG/1
650 TABLET ORAL EVERY 4 HOURS PRN
Status: DISCONTINUED | OUTPATIENT
Start: 2020-04-12 | End: 2020-04-11

## 2020-04-09 RX ORDER — OXYCODONE HYDROCHLORIDE 5 MG/1
5-10 TABLET ORAL
Status: DISCONTINUED | OUTPATIENT
Start: 2020-04-09 | End: 2020-04-11

## 2020-04-09 RX ORDER — NALOXONE HYDROCHLORIDE 0.4 MG/ML
.1-.4 INJECTION, SOLUTION INTRAMUSCULAR; INTRAVENOUS; SUBCUTANEOUS
Status: DISCONTINUED | OUTPATIENT
Start: 2020-04-09 | End: 2020-04-12 | Stop reason: HOSPADM

## 2020-04-09 RX ORDER — GLYCOPYRROLATE 0.2 MG/ML
INJECTION, SOLUTION INTRAMUSCULAR; INTRAVENOUS PRN
Status: DISCONTINUED | OUTPATIENT
Start: 2020-04-09 | End: 2020-04-09

## 2020-04-09 RX ORDER — PROPOFOL 10 MG/ML
INJECTION, EMULSION INTRAVENOUS PRN
Status: DISCONTINUED | OUTPATIENT
Start: 2020-04-09 | End: 2020-04-09

## 2020-04-09 RX ORDER — FENTANYL CITRATE 50 UG/ML
25-50 INJECTION, SOLUTION INTRAMUSCULAR; INTRAVENOUS
Status: DISCONTINUED | OUTPATIENT
Start: 2020-04-09 | End: 2020-04-09 | Stop reason: HOSPADM

## 2020-04-09 RX ORDER — HYDROXYZINE HYDROCHLORIDE 25 MG/1
25 TABLET, FILM COATED ORAL EVERY 6 HOURS PRN
Status: DISCONTINUED | OUTPATIENT
Start: 2020-04-09 | End: 2020-04-12 | Stop reason: HOSPADM

## 2020-04-09 RX ORDER — CEFAZOLIN SODIUM 1 G/3ML
1 INJECTION, POWDER, FOR SOLUTION INTRAMUSCULAR; INTRAVENOUS SEE ADMIN INSTRUCTIONS
Status: DISCONTINUED | OUTPATIENT
Start: 2020-04-09 | End: 2020-04-09 | Stop reason: HOSPADM

## 2020-04-09 RX ORDER — GABAPENTIN 300 MG/1
300 CAPSULE ORAL 2 TIMES DAILY
Status: DISPENSED | OUTPATIENT
Start: 2020-04-09 | End: 2020-04-12

## 2020-04-09 RX ORDER — LIDOCAINE 40 MG/G
CREAM TOPICAL
Status: DISCONTINUED | OUTPATIENT
Start: 2020-04-09 | End: 2020-04-12 | Stop reason: HOSPADM

## 2020-04-09 RX ORDER — DEXAMETHASONE SODIUM PHOSPHATE 4 MG/ML
INJECTION, SOLUTION INTRA-ARTICULAR; INTRALESIONAL; INTRAMUSCULAR; INTRAVENOUS; SOFT TISSUE PRN
Status: DISCONTINUED | OUTPATIENT
Start: 2020-04-09 | End: 2020-04-09

## 2020-04-09 RX ORDER — SODIUM CHLORIDE, SODIUM LACTATE, POTASSIUM CHLORIDE, CALCIUM CHLORIDE 600; 310; 30; 20 MG/100ML; MG/100ML; MG/100ML; MG/100ML
INJECTION, SOLUTION INTRAVENOUS CONTINUOUS
Status: DISCONTINUED | OUTPATIENT
Start: 2020-04-09 | End: 2020-04-09 | Stop reason: HOSPADM

## 2020-04-09 RX ORDER — SODIUM CHLORIDE 9 MG/ML
INJECTION, SOLUTION INTRAVENOUS CONTINUOUS
Status: DISCONTINUED | OUTPATIENT
Start: 2020-04-09 | End: 2020-04-12 | Stop reason: HOSPADM

## 2020-04-09 RX ORDER — ONDANSETRON 2 MG/ML
4 INJECTION INTRAMUSCULAR; INTRAVENOUS EVERY 6 HOURS PRN
Status: DISCONTINUED | OUTPATIENT
Start: 2020-04-09 | End: 2020-04-12 | Stop reason: HOSPADM

## 2020-04-09 RX ORDER — ONDANSETRON 4 MG/1
4 TABLET, ORALLY DISINTEGRATING ORAL EVERY 6 HOURS PRN
Status: DISCONTINUED | OUTPATIENT
Start: 2020-04-09 | End: 2020-04-12 | Stop reason: HOSPADM

## 2020-04-09 RX ORDER — ACETAMINOPHEN 325 MG/1
975 TABLET ORAL EVERY 8 HOURS
Status: DISCONTINUED | OUTPATIENT
Start: 2020-04-09 | End: 2020-04-11

## 2020-04-09 RX ORDER — ONDANSETRON 2 MG/ML
4 INJECTION INTRAMUSCULAR; INTRAVENOUS EVERY 30 MIN PRN
Status: DISCONTINUED | OUTPATIENT
Start: 2020-04-09 | End: 2020-04-09 | Stop reason: HOSPADM

## 2020-04-09 RX ORDER — AMOXICILLIN 250 MG
2 CAPSULE ORAL 2 TIMES DAILY
Status: DISCONTINUED | OUTPATIENT
Start: 2020-04-09 | End: 2020-04-12 | Stop reason: HOSPADM

## 2020-04-09 RX ORDER — EPHEDRINE SULFATE 50 MG/ML
INJECTION, SOLUTION INTRAMUSCULAR; INTRAVENOUS; SUBCUTANEOUS PRN
Status: DISCONTINUED | OUTPATIENT
Start: 2020-04-09 | End: 2020-04-09

## 2020-04-09 RX ORDER — ONDANSETRON 4 MG/1
4 TABLET, ORALLY DISINTEGRATING ORAL EVERY 30 MIN PRN
Status: DISCONTINUED | OUTPATIENT
Start: 2020-04-09 | End: 2020-04-09 | Stop reason: HOSPADM

## 2020-04-09 RX ORDER — MAGNESIUM HYDROXIDE 1200 MG/15ML
LIQUID ORAL PRN
Status: DISCONTINUED | OUTPATIENT
Start: 2020-04-09 | End: 2020-04-09 | Stop reason: HOSPADM

## 2020-04-09 RX ORDER — NALOXONE HYDROCHLORIDE 0.4 MG/ML
.1-.4 INJECTION, SOLUTION INTRAMUSCULAR; INTRAVENOUS; SUBCUTANEOUS
Status: ACTIVE | OUTPATIENT
Start: 2020-04-09 | End: 2020-04-10

## 2020-04-09 RX ORDER — LIDOCAINE 40 MG/G
CREAM TOPICAL
Status: DISCONTINUED | OUTPATIENT
Start: 2020-04-09 | End: 2020-04-09 | Stop reason: HOSPADM

## 2020-04-09 RX ORDER — AMOXICILLIN 250 MG
1 CAPSULE ORAL 2 TIMES DAILY
Status: DISCONTINUED | OUTPATIENT
Start: 2020-04-09 | End: 2020-04-12 | Stop reason: HOSPADM

## 2020-04-09 RX ORDER — LIDOCAINE HYDROCHLORIDE 10 MG/ML
INJECTION, SOLUTION INFILTRATION; PERINEURAL PRN
Status: DISCONTINUED | OUTPATIENT
Start: 2020-04-09 | End: 2020-04-09

## 2020-04-09 RX ADMIN — MIDAZOLAM 2 MG: 1 INJECTION INTRAMUSCULAR; INTRAVENOUS at 07:49

## 2020-04-09 RX ADMIN — CEFAZOLIN SODIUM 2 G: 2 INJECTION, SOLUTION INTRAVENOUS at 07:59

## 2020-04-09 RX ADMIN — PHENYLEPHRINE HYDROCHLORIDE 100 MCG: 10 INJECTION INTRAVENOUS at 08:29

## 2020-04-09 RX ADMIN — VANCOMYCIN HYDROCHLORIDE 1750 MG: 10 INJECTION, POWDER, LYOPHILIZED, FOR SOLUTION INTRAVENOUS at 12:23

## 2020-04-09 RX ADMIN — ACETAMINOPHEN 975 MG: 325 TABLET, FILM COATED ORAL at 12:16

## 2020-04-09 RX ADMIN — HYDROMORPHONE HYDROCHLORIDE 0.5 MG: 1 INJECTION, SOLUTION INTRAMUSCULAR; INTRAVENOUS; SUBCUTANEOUS at 10:09

## 2020-04-09 RX ADMIN — PROPOFOL 160 MG: 10 INJECTION, EMULSION INTRAVENOUS at 07:55

## 2020-04-09 RX ADMIN — ACETAMINOPHEN 650 MG: 325 TABLET, FILM COATED ORAL at 00:17

## 2020-04-09 RX ADMIN — GABAPENTIN 300 MG: 300 CAPSULE ORAL at 12:16

## 2020-04-09 RX ADMIN — PHENYLEPHRINE HYDROCHLORIDE 150 MCG: 10 INJECTION INTRAVENOUS at 07:59

## 2020-04-09 RX ADMIN — INSULIN ASPART 3 UNITS: 100 INJECTION, SOLUTION INTRAVENOUS; SUBCUTANEOUS at 18:14

## 2020-04-09 RX ADMIN — PHENYLEPHRINE HYDROCHLORIDE 150 MCG: 10 INJECTION INTRAVENOUS at 08:18

## 2020-04-09 RX ADMIN — PHENYLEPHRINE HYDROCHLORIDE 150 MCG: 10 INJECTION INTRAVENOUS at 08:03

## 2020-04-09 RX ADMIN — ACETAMINOPHEN 975 MG: 325 TABLET, FILM COATED ORAL at 18:17

## 2020-04-09 RX ADMIN — SODIUM CHLORIDE: 9 INJECTION, SOLUTION INTRAVENOUS at 12:25

## 2020-04-09 RX ADMIN — SENNOSIDES AND DOCUSATE SODIUM 1 TABLET: 8.6; 5 TABLET ORAL at 12:17

## 2020-04-09 RX ADMIN — CEFTRIAXONE 1 G: 1 INJECTION, POWDER, FOR SOLUTION INTRAMUSCULAR; INTRAVENOUS at 16:52

## 2020-04-09 RX ADMIN — PHENYLEPHRINE HYDROCHLORIDE 150 MCG: 10 INJECTION INTRAVENOUS at 08:11

## 2020-04-09 RX ADMIN — DEXAMETHASONE SODIUM PHOSPHATE 4 MG: 4 INJECTION, SOLUTION INTRA-ARTICULAR; INTRALESIONAL; INTRAMUSCULAR; INTRAVENOUS; SOFT TISSUE at 08:55

## 2020-04-09 RX ADMIN — FENTANYL CITRATE 50 MCG: 50 INJECTION, SOLUTION INTRAMUSCULAR; INTRAVENOUS at 08:31

## 2020-04-09 RX ADMIN — PHENYLEPHRINE HYDROCHLORIDE 150 MCG: 10 INJECTION INTRAVENOUS at 08:23

## 2020-04-09 RX ADMIN — FENTANYL CITRATE 100 MCG: 50 INJECTION, SOLUTION INTRAMUSCULAR; INTRAVENOUS at 07:55

## 2020-04-09 RX ADMIN — Medication 10 MG: at 08:29

## 2020-04-09 RX ADMIN — ROCURONIUM BROMIDE 7 MG: 10 INJECTION INTRAVENOUS at 07:53

## 2020-04-09 RX ADMIN — INSULIN ASPART 2 UNITS: 100 INJECTION, SOLUTION INTRAVENOUS; SUBCUTANEOUS at 14:02

## 2020-04-09 RX ADMIN — FENTANYL CITRATE 50 MCG: 50 INJECTION, SOLUTION INTRAMUSCULAR; INTRAVENOUS at 09:13

## 2020-04-09 RX ADMIN — VANCOMYCIN HYDROCHLORIDE 1750 MG: 10 INJECTION, POWDER, LYOPHILIZED, FOR SOLUTION INTRAVENOUS at 00:09

## 2020-04-09 RX ADMIN — GLYCOPYRROLATE 0.2 MG: 0.2 INJECTION, SOLUTION INTRAMUSCULAR; INTRAVENOUS at 07:53

## 2020-04-09 RX ADMIN — FENTANYL CITRATE 50 MCG: 50 INJECTION, SOLUTION INTRAMUSCULAR; INTRAVENOUS at 08:32

## 2020-04-09 RX ADMIN — PHENYLEPHRINE HYDROCHLORIDE 150 MCG: 10 INJECTION INTRAVENOUS at 08:08

## 2020-04-09 RX ADMIN — Medication 100 MG: at 07:53

## 2020-04-09 RX ADMIN — OXYCODONE HYDROCHLORIDE 5 MG: 5 TABLET ORAL at 05:57

## 2020-04-09 RX ADMIN — SODIUM CHLORIDE, POTASSIUM CHLORIDE, SODIUM LACTATE AND CALCIUM CHLORIDE: 600; 310; 30; 20 INJECTION, SOLUTION INTRAVENOUS at 07:47

## 2020-04-09 RX ADMIN — ONDANSETRON 4 MG: 2 INJECTION INTRAMUSCULAR; INTRAVENOUS at 08:55

## 2020-04-09 RX ADMIN — GABAPENTIN 300 MG: 300 CAPSULE ORAL at 22:28

## 2020-04-09 RX ADMIN — SODIUM CHLORIDE, POTASSIUM CHLORIDE, SODIUM LACTATE AND CALCIUM CHLORIDE: 600; 310; 30; 20 INJECTION, SOLUTION INTRAVENOUS at 08:52

## 2020-04-09 RX ADMIN — HYDROMORPHONE HYDROCHLORIDE 0.5 MG: 1 INJECTION, SOLUTION INTRAMUSCULAR; INTRAVENOUS; SUBCUTANEOUS at 09:49

## 2020-04-09 RX ADMIN — ASPIRIN 325 MG: 325 TABLET, COATED ORAL at 14:33

## 2020-04-09 RX ADMIN — SENNOSIDES AND DOCUSATE SODIUM 1 TABLET: 8.6; 5 TABLET ORAL at 22:28

## 2020-04-09 RX ADMIN — LIDOCAINE HYDROCHLORIDE 100 MG: 10 INJECTION, SOLUTION INFILTRATION; PERINEURAL at 07:55

## 2020-04-09 ASSESSMENT — ACTIVITIES OF DAILY LIVING (ADL)
ADLS_ACUITY_SCORE: 11

## 2020-04-09 ASSESSMENT — LIFESTYLE VARIABLES: TOBACCO_USE: 1

## 2020-04-09 ASSESSMENT — ENCOUNTER SYMPTOMS: DYSRHYTHMIAS: 0

## 2020-04-09 ASSESSMENT — MIFFLIN-ST. JEOR: SCORE: 1629.71

## 2020-04-09 NOTE — PROGRESS NOTES
"[facility]  Infectious Disease Progress Note          Assessment and Plan:   IMPRESSION:   1.  A 45-year-old male with subacute worsening left knee area abscess, mild systemic sepsis with cultures pending.   2.  Prior history of some recurrent skin lesions treated with Bactrim in the past, but no history of MRSA.   3.  Poorly controlled diabetes mellitus.      RECOMMENDATIONS:   1.  Op now , await cx/findings  2.  Ceftriaxone and vancomycin.  This is likely Staph aureus and certainly possible MRSA, although he has not had that historically.  Amount of antibiotics depending on I and D findings.  Possibly oral therapy relatively early postop.              Interval History:   no new complaints and doing well; no cp, sob, n/v/d, or abd pain. Typical necrotic abscess MRI findings              Medications:       [Auto Hold] aspirin  81 mg Oral Daily     [Auto Hold] cefTRIAXone  1 g Intravenous Q24H     [Auto Hold] insulin aspart  1-7 Units Subcutaneous TID AC     [Auto Hold] insulin aspart  1-5 Units Subcutaneous At Bedtime     [Auto Hold] insulin detemir  35 Units Subcutaneous At Bedtime     [Auto Hold] sodium chloride (PF)  3 mL Intracatheter Q8H     [Auto Hold] vancomycin (VANCOCIN) IV  1,750 mg Intravenous Q12H                  Physical Exam:   Blood pressure (!) 164/90, pulse 94, temperature 96.7  F (35.9  C), resp. rate 13, height 1.651 m (5' 5\"), weight 82.3 kg (181 lb 6.4 oz), SpO2 99 %.  Wt Readings from Last 2 Encounters:   04/09/20 82.3 kg (181 lb 6.4 oz)   10/03/17 85.3 kg (188 lb)     Vital Signs with Ranges  Temp:  [96.7  F (35.9  C)-102.2  F (39  C)] 96.7  F (35.9  C)  Pulse:  [92-96] 94  Heart Rate:  [] 94  Resp:  [12-16] 13  BP: (129-165)/(70-96) 164/90  SpO2:  [95 %-100 %] 99 %    Constitutional: Awake, alert, cooperative, no apparent distress   Lungs: Clear to auscultation bilaterally, no crackles or wheezing   Cardiovascular: Regular rate and rhythm, normal S1 and S2, and " no murmur noted   Abdomen: Normal bowel sounds, soft, non-distended, non-tender   Skin: No rashes, no cyanosis, no edema   Other:           Data:   All microbiology laboratory data reviewed.  Recent Labs   Lab Test 04/09/20  0620 04/08/20  0659 04/07/20  1323   WBC 17.0* 16.6* 16.9*   HGB 11.8* 12.7* 13.7   HCT 36.6* 38.8* 40.8   MCV 87 88 87    304 323     Recent Labs   Lab Test 04/09/20  0620 04/08/20  0659 04/07/20  1323   CR 1.00 1.08 1.22     Recent Labs   Lab Test 04/07/20  1519   SED 40*     Recent Labs   Lab Test 04/07/20  1518 04/07/20  1323 01/12/17  1212 01/12/17  0843 01/12/17  0839   CULT No growth after 2 days No growth after 2 days No growth Cultured on the 2nd day of incubation: Micrococcus species  Critical Value/Significant Value, preliminary result only, called to and read   back by Laci MA (ER Nurse ) at 1337 on 1.14.17, cn.  (Note)  NEGATIVE for the following: Staphylococcus spp., Staph aureus, Staph  epidermidis, Staph lugdunensis, Streptococcus spp., Strep pneumoniae,  Strep pyogenes, Strep agalactiae, Strep anginosus group, Enterococcus  faecalis, Enterococcus faecium, and Listeria spp. by [employer]  multiplex nucleic acid test. Final identification and antimicrobial  susceptibility testing will be verified by standard methods.    Critical Value/Significant Value called to and read back by ANDRAE ESTRADA RN (OBSERVATION) @8146 1/14/17. CT  * No growth

## 2020-04-09 NOTE — PROGRESS NOTES
[facility]  Hospitalist Progress Note  Ken Li MD 04/09/20    Reason for Stay (Diagnosis): left leg cellulitis, phlegmon         Assessment and Plan:      Summary of Stay: Kris Moeller Sr. is a 45 year old male with history of poorly controlled IDDM type II and HLD who was admitted on 4/7/2020 with left lateral upper leg pain, erythema, swelling, and drainage from a pustule that he picked it consistent with cellulitis and possible abscess.  Febrile to 100  F.  Leukocytosis at 16.9 and CRP elevated at 110/ESR 40.  Started on IV ceftriaxone.  ID consulted and added IV vancomycin.  Orthopedic surgery consulted.  MRI of the left thigh did not show any discrete abscess, but did show intramuscular edema in the vastus lateralis and short biceps.  Underwent I&D 4/9 that showed a phlegmon involving the subcutaneous tissue, wound left open and wound VAC placed.  Plan to return to the OR in 48 hours.  Intra-Op cultures were obtained.  Continuing IV antibiotics and adjusting insulin as necessary.    Problem List/Assessment and Plan:   Left lateral thigh cellulitis, phlegmon: Patient had a small pustule on the lateral aspect of the thigh near his knee which he picked at 2 weeks ago.  This became red and swollen and has drained since then.  Now has an eschar over it.  Fevers at home along with chills.  Intermittent fevers here up to 102  F.  Leukocytosis of 16.9 elevated inflammatory markers with /ESR 40.  MRI of the left thigh did not show any discrete abscess, but did show intramuscular edema in the vastus lateralis and short biceps.  -ID consulted  -Continue IV vancomycin and ceftriaxone  -Orthopedics consulted. Underwent I&D 4/9 that showed a phlegmon involving the subcutaneous tissue, wound left open and wound VAC placed.  Plan to return to the OR in 48 hours.  Intra-Op cultures were obtained.  -Acetaminophen and oxycodone as needed for pain control  -Elevate left leg  -Blood cultures  "NGTD    Poorly controlled IDDM type II: PTA on Levemir 45 units at bedtime along with aspart 15 units twice daily with meals and metformin 1000 mg twice daily with meals.  Also takes 81 mg aspirin.  Poorly controlled in the past.  A1c is 12.3  -Metformin held due to acute infection  -Increase Levemir dose to 45 units at bedtime  -Medium dose sliding scale insulin    HLD: Not currently on any medications for this, follow-up with PCP.      DVT Prophylaxis: Pneumatic Compression Devices  Code Status: Full Code  FEN: Consistent carbohydrate moderate   Discharge Dispo: Home  Estimated Disch Date / # of Days until Disch: Plan for return to OR believe on 4/11        Interval History (Subjective):      Febrile overnight to 102  F.  Denies any significant left leg pain.  No chills.  No nausea.  No shortness of breath.  Underwent I&D earlier today and is doing well postop.    Entire encounter completed assistance of professional telephone .                  Physical Exam:      Last Vital Signs:  /69 (BP Location: Right arm)   Pulse 87   Temp 99.7  F (37.6  C) (Temporal)   Resp 14   Ht 1.651 m (5' 5\")   Wt 82.3 kg (181 lb 6.4 oz)   SpO2 94%   BMI 30.19 kg/m      Intake/Output Summary (Last 24 hours) at 4/9/2020 1328  Last data filed at 4/9/2020 1059  Gross per 24 hour   Intake 1300 ml   Output 700 ml   Net 600 ml       Constitutional: Awake, NAD   Eyes: sclera white   HEENT:  MMM  Respiratory:   no crackles or wheeze  Cardiovascular: RRR.  No murmur   GI: non-tender, not distended, bowel sounds present  Skin/Musculoskeletal/extremities: left leg ace wrapped, trace LLE edema  Neurologic: A&O   Psychiatric: calm, cooperative          Medications:      All current medications were reviewed with changes reflected in problem list.         Data:      All new lab and imaging data was reviewed.   Labs:  Recent Labs   Lab 04/09/20  0839 04/07/20  1518 04/07/20  1323   CULT PENDING  PENDING No growth after 2 " days No growth after 2 days     Recent Labs   Lab 04/09/20  0620 04/08/20  0659 04/07/20  1323    134 133   POTASSIUM 3.8 4.4 4.3   CHLORIDE 104 105 103   CO2 27 25 27   ANIONGAP 4 4 3   * 220* 295*   BUN 19 21 26   CR 1.00 1.08 1.22   GFRESTIMATED 90 82 71   GFRESTBLACK >90 >90 82   KHALIDA 8.5 8.5 8.5     Recent Labs   Lab 04/09/20  0620 04/08/20  0659 04/07/20  1323   WBC 17.0* 16.6* 16.9*   HGB 11.8* 12.7* 13.7   HCT 36.6* 38.8* 40.8   MCV 87 88 87    304 323      Imaging:   Recent Results (from the past 24 hour(s))   MR Knee Left w/o & w Contrast    Narrative    MR KNEE LEFT W/O & W CONTRAST   4/8/2020 5:07 PM    HISTORY:  Soft tissue infection suspected, femur, initial exam    TECHNIQUE: Multiplanar T1 and T2-weighted images without and with  intravenous contrast.    FINDINGS:   Osseous Structures:  No cortical erosion, periosteal reaction,  intramedullary edema or enhancement to suggest osteomyelitis.     Soft Tissue: There is a shallow cutaneous ulceration in the lateral  aspect of the distal thigh. Deep to the cutaneous ulceration there is  an ill-defined area of soft tissue thickening without central fluid  that measures 4.0 cm CC by 3.0 cm AP and 1.5 cm in RL dimension  consistent with inflamed/infected phlegmon. Moderate diffuse  subcutaneous edema and enhancement in the lateral, anterolateral and  posterior lateral soft tissues.    Muscle/Tendons: There is mild edema and enhancement in the distal  lateral aspect of the vastus lateralis and shortening of the biceps  musculature which could be reactive or due to deep intramuscular  infection. No focal intramuscular abscess.    Trochanteric and Iliopsoas Bursae: No fluid collection in the  trochanteric and iliopsoas bursae.    Joint space: No evidence for septic arthritis. Moderate size knee  joint effusion.       Impression    IMPRESSION:   1. Focal area of shallow ulceration and deep soft tissue  infection/inflammation without discrete  abscess in the lateral aspect  of the distal thigh.  2. Intramuscular edema in the vastus lateralis and short biceps which  could be reactive or due to deep infection.  3. No septic arthritis or osteoarthritis.  4. Knee joint effusion.      MD Ken GARCIA MD

## 2020-04-09 NOTE — PLAN OF CARE
0058 - MD paged - FYI: Pt admitted with cellulitis. Temp 102.2. No pain, no SOB. Tylenol given. Lactic drawn and was 0.9.     Pt up independently with walker. Temp of 102.2, tylenol given, recheck of 100. Pain increases with mobility. Received oxycodone 5mg tablet for pain. Glucose was 240. Receiving Vanco and Rocephin for abx therapy. Surgical shower performed. NPO. Voiding adequately. Went down to PACU at 0625. Plan is to have surgery at 0730.    Cellphone and wallet in bedside table.

## 2020-04-09 NOTE — PLAN OF CARE
Ambulatory Status:  Pt up Ax1-2 with walker and gaitbelt. Has yet to ambulate since I&D  VS:  VSS  Pain:  denies  Resp: LS clear equal bilat on RA. Capnography monitoring  GI:  denies nausea.  good appetite and on clear liquid advance as tolerated diet.  BS hypoactivex4.  Passing flatus.  Last BM 4/7.  :  Due to void. Straight cath for 600cc out right before leaving PACU  Skin:  Compression wrap in place  Tx:  Abx, fluids until tolerating PO, ambulation, capnography  Labs:  WBC 17.0, cultures pending  Consults:  ID  Disposition:  TBD    Chun Davis, RN on 4/9/2020 at 3:02 PM

## 2020-04-09 NOTE — ANESTHESIA CARE TRANSFER NOTE
Patient: Kris Moeller Sr.    Procedure(s):  INCISION AND DRAINAGE LEFT LEG, WOUND VAC PLACEMENT    Diagnosis: Infected abrasion of left leg [S80.812A, L08.9]  Diagnosis Additional Information: No value filed.    Anesthesia Type:   General     Note:  Airway :Face Mask  Patient transferred to:PACU  Comments: Pt transferred to PACU; VSS; Report to RNHandoff Report: Identifed the Patient, Identified the Reponsible Provider, Reviewed the pertinent medical history, Discussed the surgical course, Reviewed Intra-OP anesthesia mangement and issues during anesthesia, Set expectations for post-procedure period and Allowed opportunity for questions and acknowledgement of understanding      Vitals: (Last set prior to Anesthesia Care Transfer)    CRNA VITALS  4/9/2020 0849 - 4/9/2020 0926      4/9/2020             Pulse:  95    SpO2:  100 %                Electronically Signed By: TAVO Tuttle CRNA  April 9, 2020  9:26 AM

## 2020-04-09 NOTE — ANESTHESIA PREPROCEDURE EVALUATION
Anesthesia Pre-Procedure Evaluation    Patient: Kris Moeller Sr.   MRN: [id number] : 1975          Preoperative Diagnosis: Infected abrasion of left leg [S80.812A, L08.9]    Procedure(s):  INCISION AND DRAINAGE LEFT LEG, POSSIBLE WOUND VAC PLACEMENT    Past Medical History:   Diagnosis Date     Diabetes mellitus (H)      Hyperlipidemia      Hypertension      TIA (transient ischemic attack)      History reviewed. No pertinent surgical history.  Anesthesia Evaluation     .             ROS/MED HX    ENT/Pulmonary:     (+)tobacco use, Current use , . .   (-) asthma and sleep apnea   Neurologic:     (+)TIA    (-) Other neuro hx   Cardiovascular:     (+) Dyslipidemia, hypertension----. : . . . :. .      (-) CAD, CHF, arrhythmias and pulmonary hypertension   METS/Exercise Tolerance:     Hematologic:        (-) anemia   Musculoskeletal:   (+)  other musculoskeletal- Leg Abscess      GI/Hepatic:        (-) GERD, hiatal hernia and hepatitis   Renal/Genitourinary:         Endo:     (+) type II DM .   (-) thyroid disease, chronic steroid usage, other endocrine disorder and obesity   Psychiatric:        (-) psychiatric history   Infectious Disease:   (+) Other Infectious Disease       Malignancy:      - no malignancy   Other:    - neg other ROS                      Physical Exam      Airway   Mallampati: II  TM distance: >3 FB  Neck ROM: full    Dental     Cardiovascular   Rhythm and rate: regular and normal  (-) no murmur    Pulmonary    breath sounds clear to auscultation    Other findings: Lab Test        04/08/20     04/07/20     10/03/17      --          17                       0659          1323          1905           --           0621          WBC          16.6*        16.9*        12.3*          < >        15.6*         HGB          12.7*        13.7         13.9           < >        14.8          MCV          88           87           85             < >        85            PLT          304          323       "    282            < >        272           INR           --           --           --           --          0.95           < > = values in this interval not displayed.                  Lab Test        04/08/20     04/07/20     10/03/17                       0659          1323          1905          NA           134          133          137           POTASSIUM    4.4          4.3          4.0           CHLORIDE     105          103          103           CO2          25           27           26            BUN          21           26           16            CR           1.08         1.22         0.85          ANIONGAP     4            3            8             KHALIDA          8.5          8.5          8.7           GLC          220*         295*         235*                Lab Results   Component Value Date    WBC 16.6 (H) 04/08/2020    HGB 12.7 (L) 04/08/2020    HCT 38.8 (L) 04/08/2020     04/08/2020    .0 (H) 04/07/2020    SED 40 (H) 04/07/2020     04/08/2020    POTASSIUM 4.4 04/08/2020    CHLORIDE 105 04/08/2020    CO2 25 04/08/2020    BUN 21 04/08/2020    CR 1.08 04/08/2020     (H) 04/08/2020    KHALIDA 8.5 04/08/2020    PHOS 4.1 10/16/2010    MAG 1.9 10/16/2010    ISMAEL 38 01/12/2017    PTT 29 01/12/2017    INR 0.95 01/12/2017    TSH 2.26 01/12/2017       Preop Vitals  BP Readings from Last 3 Encounters:   04/09/20 130/74   10/03/17 125/75   01/13/17 118/65    Pulse Readings from Last 3 Encounters:   04/07/20 84   10/03/17 97   01/13/17 75      Resp Readings from Last 3 Encounters:   04/09/20 15   10/03/17 16   01/13/17 16    SpO2 Readings from Last 3 Encounters:   04/09/20 95%   10/03/17 97%   01/13/17 94%      Temp Readings from Last 1 Encounters:   04/09/20 97.4  F (36.3  C) (Temporal)    Ht Readings from Last 1 Encounters:   04/07/20 1.651 m (5' 5\")      Wt Readings from Last 1 Encounters:   04/08/20 84.8 kg (187 lb)    Estimated body mass index is 31.12 kg/m  as calculated from the " "following:    Height as of this encounter: 1.651 m (5' 5\").    Weight as of this encounter: 84.8 kg (187 lb).       Anesthesia Plan      History & Physical Review  History and physical reviewed and following examination; no interval change.    ASA Status:  3 .    NPO Status:  > 8 hours    Plan for General with Propofol induction. Maintenance will be Balanced.    PONV prophylaxis:  Ondansetron (or other 5HT-3)         Postoperative Care  Postoperative pain management:  IV analgesics and Oral pain medications.      Consents  Anesthetic plan, risks, benefits and alternatives discussed with:  Patient..                 Cody Ford MD                    .  "

## 2020-04-09 NOTE — PLAN OF CARE
Pt A&O, vitals monitored. Ukrainian speaking but does not require  for basic nursing care.  Up independent in room.  Oxycodone administered x1 for discomfort.  MRI completed this shift.  Pt to have I&D Thursday am.  IV saline locked, rocephin administered per order.  BG monitored.  Will continue to monitor.

## 2020-04-09 NOTE — PROGRESS NOTES
Arrived to room 625 from PACU at 1111 via cart, transferred to bed via hover mat without difficulty, alert and oriented x 4, oriented to room and call system, dressing CDI, CMS intact, IV patent and infusing, hemovac , rates pain 0/10, reoriented to room setting. SCD's on RLE. Ferdinand ice chips well. Frequent VS started.

## 2020-04-09 NOTE — ANESTHESIA POSTPROCEDURE EVALUATION
Patient: Kris Moeller Sr.    Procedure(s):  INCISION AND DRAINAGE LEFT LEG, WOUND VAC PLACEMENT    Diagnosis:Infected abrasion of left leg [S80.812A, L08.9]  Diagnosis Additional Information: No value filed.    Anesthesia Type:  General    Note:  Anesthesia Post Evaluation    Patient location during evaluation: PACU  Patient participation: Able to fully participate in evaluation  Level of consciousness: awake  Pain management: adequate  Airway patency: patent  Cardiovascular status: acceptable  Respiratory status: acceptable  Hydration status: euvolemic  PONV: controlled     Anesthetic complications: None          Last vitals:  Vitals:    04/09/20 1230 04/09/20 1300 04/09/20 1400   BP: (!) 154/85 134/69 115/60   Pulse: 97 87 88   Resp: 12 14 16   Temp: 98.1  F (36.7  C) 99.7  F (37.6  C) 98.1  F (36.7  C)   SpO2: 95% 94% 95%         Electronically Signed By: Cody Ford MD  April 9, 2020  3:10 PM

## 2020-04-09 NOTE — OP NOTE
Procedure Date: 04/09/2020      PREOPERATIVE DIAGNOSIS:  Phlegmon left posterior lateral thigh.      POSTOPERATIVE DIAGNOSIS:  Phlegmon left posterior lateral thigh.      PROCEDURES PERFORMED:     1.  Irrigation and debridement phlegmon left posterolateral thigh involving skin, subcutaneous tissue and fascia through an incision measuring 6 x 6 cm in length.   2.  Placement of wound VAC.      ANESTHESIA:  General.      SURGEON:  Claudy Wagner MD      ASSISTANT:  Leona Guzman PA-C      COMPLICATIONS:  None noted.      INDICATIONS:  Kris is a 45-year-old gentleman who developed an abscess/phlegmon over the posterolateral aspect of his left thigh.  He was admitted to the hospital.  His symptoms improved only mildly with IV antibiotics.  An MRI scan was obtained demonstrating a complex fluid collection consistent with an abscess/phlegmon over the lateral knee superficial to the iliotibial band.  It did not appear to involve the joint, although he did have an effusion in his joint on exam.  His knee joint on exam was benign.  I recommended surgery for evacuation of the phlegmon and removal of any necrotic tissue.  All questions were answered.  Informed consent was obtained.        DESCRIPTION OF PROCEDURE:  Kris was brought to the operating room, where general anesthetic was obtained.  He was placed in the right lateral decubitus position on a half beanbag.  A tourniquet was applied to his thigh.  His leg was prepped and draped in sterile fashion using Betadine.      After timeout was performed, leg was elevated and exsanguinated, tourniquet was inflated to 250 mmHg.  He had an eschar measuring 2 x 1 cm in an ellipse-type shape over the lateral aspect of his thigh over the iliotibial band and superficial to the hamstring tendons.  I made an incision about 2 cm above and 2 cm below and ellipsed the necrotic tissue medially, encountering purulent material, which was aspirated and sent for Gram stain and cultures,  including aerobes and anaerobes.  The phlegmon appeared to be localized to the subcutaneous tissue.  It was evacuated using a combination of sharp dissection with a rongeur and a curet.  Hydrogen peroxide, saline post-irrigation and dilute Betadine solution.  I removed any necrotic-appearing tissue.  I undermined the normal-appearing tissue only slightly so that I could mobilize the skin edges.  Everything was superficial to the fascia.  Once I had things back to what appeared to be healthy tissue, I placed a wound VAC sponge in the wound and covered it appropriately creating a seal at 125 mmHg.  Prior to placing the plastic over the sponge, I placed 3-0 nylon in a vertical mattress fashion and stay sutures to prevent the wound edges from retracting.  He was then placed in a compressive soft dressing and awoken and transferred to recovery room in stable condition.      POSTOPERATIVE PLAN:  Continue the IV antibiotics per Infectious Disease's direction.  He will return to the operating room in 48 hours for removal of wound VAC, repeat irrigation and debridement and likely primary closure over a drain.         JOSEPH VERONICA MD             D: 2020   T: 2020   MT: THOMAS      Name:     DANIELA JOSEPH   MRN:      [id number]        Account:        [id number]   :      1975           Procedure Date: 2020      Document: [id number]

## 2020-04-09 NOTE — PROGRESS NOTES
An EKG was completed on the pt, with no complications noted during the procedure.    Clfiton Gardner RT on 4/9/2020 at 3:12 AM

## 2020-04-10 ENCOUNTER — APPOINTMENT (OUTPATIENT)
Dept: OCCUPATIONAL THERAPY | Facility: CLINIC | Age: 45
End: 2020-04-10
Attending: PHYSICIAN ASSISTANT
Payer: MEDICAID

## 2020-04-10 ENCOUNTER — APPOINTMENT (OUTPATIENT)
Dept: PHYSICAL THERAPY | Facility: CLINIC | Age: 45
End: 2020-04-10
Attending: PHYSICIAN ASSISTANT
Payer: MEDICAID

## 2020-04-10 LAB
BASOPHILS # BLD AUTO: 0 10E9/L (ref 0–0.2)
BASOPHILS NFR BLD AUTO: 0.2 %
CREAT SERPL-MCNC: 0.82 MG/DL (ref 0.66–1.25)
DIFFERENTIAL METHOD BLD: ABNORMAL
EOSINOPHIL # BLD AUTO: 0.1 10E9/L (ref 0–0.7)
EOSINOPHIL NFR BLD AUTO: 0.6 %
ERYTHROCYTE [DISTWIDTH] IN BLOOD BY AUTOMATED COUNT: 11.8 % (ref 10–15)
GFR SERPL CREATININE-BSD FRML MDRD: >90 ML/MIN/{1.73_M2}
GLUCOSE BLDC GLUCOMTR-MCNC: 173 MG/DL (ref 70–99)
GLUCOSE BLDC GLUCOMTR-MCNC: 221 MG/DL (ref 70–99)
GLUCOSE BLDC GLUCOMTR-MCNC: 222 MG/DL (ref 70–99)
GLUCOSE BLDC GLUCOMTR-MCNC: 240 MG/DL (ref 70–99)
GLUCOSE BLDC GLUCOMTR-MCNC: 253 MG/DL (ref 70–99)
GLUCOSE SERPL-MCNC: 191 MG/DL (ref 70–99)
HCT VFR BLD AUTO: 34 % (ref 40–53)
HGB BLD-MCNC: 11.1 G/DL (ref 13.3–17.7)
IMM GRANULOCYTES # BLD: 0.1 10E9/L (ref 0–0.4)
IMM GRANULOCYTES NFR BLD: 0.4 %
INTERPRETATION ECG - MUSE: NORMAL
LYMPHOCYTES # BLD AUTO: 3 10E9/L (ref 0.8–5.3)
LYMPHOCYTES NFR BLD AUTO: 18.6 %
MCH RBC QN AUTO: 28.7 PG (ref 26.5–33)
MCHC RBC AUTO-ENTMCNC: 32.6 G/DL (ref 31.5–36.5)
MCV RBC AUTO: 88 FL (ref 78–100)
MONOCYTES # BLD AUTO: 1.2 10E9/L (ref 0–1.3)
MONOCYTES NFR BLD AUTO: 7.2 %
NEUTROPHILS # BLD AUTO: 11.7 10E9/L (ref 1.6–8.3)
NEUTROPHILS NFR BLD AUTO: 73 %
NRBC # BLD AUTO: 0 10*3/UL
NRBC BLD AUTO-RTO: 0 /100
PLATELET # BLD AUTO: 304 10E9/L (ref 150–450)
RBC # BLD AUTO: 3.87 10E12/L (ref 4.4–5.9)
VANCOMYCIN SERPL-MCNC: 16.1 MG/L
WBC # BLD AUTO: 16.1 10E9/L (ref 4–11)

## 2020-04-10 PROCEDURE — 36415 COLL VENOUS BLD VENIPUNCTURE: CPT | Performed by: PHYSICIAN ASSISTANT

## 2020-04-10 PROCEDURE — 00000146 ZZHCL STATISTIC GLUCOSE BY METER IP

## 2020-04-10 PROCEDURE — 25000132 ZZH RX MED GY IP 250 OP 250 PS 637: Performed by: INTERNAL MEDICINE

## 2020-04-10 PROCEDURE — 82947 ASSAY GLUCOSE BLOOD QUANT: CPT | Performed by: PHYSICIAN ASSISTANT

## 2020-04-10 PROCEDURE — 25000128 H RX IP 250 OP 636: Performed by: STUDENT IN AN ORGANIZED HEALTH CARE EDUCATION/TRAINING PROGRAM

## 2020-04-10 PROCEDURE — 12000000 ZZH R&B MED SURG/OB

## 2020-04-10 PROCEDURE — 25000132 ZZH RX MED GY IP 250 OP 250 PS 637: Performed by: PHYSICIAN ASSISTANT

## 2020-04-10 PROCEDURE — 85025 COMPLETE CBC W/AUTO DIFF WBC: CPT | Performed by: PHYSICIAN ASSISTANT

## 2020-04-10 PROCEDURE — 97535 SELF CARE MNGMENT TRAINING: CPT | Mod: GO

## 2020-04-10 PROCEDURE — 82565 ASSAY OF CREATININE: CPT | Performed by: PHYSICIAN ASSISTANT

## 2020-04-10 PROCEDURE — 99233 SBSQ HOSP IP/OBS HIGH 50: CPT | Performed by: INTERNAL MEDICINE

## 2020-04-10 PROCEDURE — 97116 GAIT TRAINING THERAPY: CPT | Mod: GP | Performed by: PHYSICAL THERAPIST

## 2020-04-10 PROCEDURE — 97530 THERAPEUTIC ACTIVITIES: CPT | Mod: GP | Performed by: PHYSICAL THERAPIST

## 2020-04-10 PROCEDURE — 25000131 ZZH RX MED GY IP 250 OP 636 PS 637: Performed by: INTERNAL MEDICINE

## 2020-04-10 PROCEDURE — 97161 PT EVAL LOW COMPLEX 20 MIN: CPT | Mod: GP | Performed by: PHYSICAL THERAPIST

## 2020-04-10 PROCEDURE — 25800030 ZZH RX IP 258 OP 636: Performed by: STUDENT IN AN ORGANIZED HEALTH CARE EDUCATION/TRAINING PROGRAM

## 2020-04-10 PROCEDURE — 97165 OT EVAL LOW COMPLEX 30 MIN: CPT | Mod: GO

## 2020-04-10 RX ORDER — POLYETHYLENE GLYCOL 3350 17 G/17G
17 POWDER, FOR SOLUTION ORAL DAILY PRN
Status: DISCONTINUED | OUTPATIENT
Start: 2020-04-10 | End: 2020-04-12 | Stop reason: HOSPADM

## 2020-04-10 RX ADMIN — ASPIRIN 325 MG: 325 TABLET, COATED ORAL at 08:17

## 2020-04-10 RX ADMIN — ACETAMINOPHEN 975 MG: 325 TABLET, FILM COATED ORAL at 06:20

## 2020-04-10 RX ADMIN — INSULIN ASPART 1 UNITS: 100 INJECTION, SOLUTION INTRAVENOUS; SUBCUTANEOUS at 08:16

## 2020-04-10 RX ADMIN — VANCOMYCIN HYDROCHLORIDE 1750 MG: 10 INJECTION, POWDER, LYOPHILIZED, FOR SOLUTION INTRAVENOUS at 12:51

## 2020-04-10 RX ADMIN — GABAPENTIN 300 MG: 300 CAPSULE ORAL at 20:14

## 2020-04-10 RX ADMIN — INSULIN DETEMIR 40 UNITS: 100 INJECTION, SOLUTION SUBCUTANEOUS at 22:06

## 2020-04-10 RX ADMIN — ACETAMINOPHEN 975 MG: 325 TABLET, FILM COATED ORAL at 12:50

## 2020-04-10 RX ADMIN — ACETAMINOPHEN 975 MG: 325 TABLET, FILM COATED ORAL at 19:04

## 2020-04-10 RX ADMIN — CEFTRIAXONE 1 G: 1 INJECTION, POWDER, FOR SOLUTION INTRAMUSCULAR; INTRAVENOUS at 16:12

## 2020-04-10 RX ADMIN — VANCOMYCIN HYDROCHLORIDE 1750 MG: 10 INJECTION, POWDER, LYOPHILIZED, FOR SOLUTION INTRAVENOUS at 23:56

## 2020-04-10 RX ADMIN — INSULIN ASPART 2 UNITS: 100 INJECTION, SOLUTION INTRAVENOUS; SUBCUTANEOUS at 19:03

## 2020-04-10 RX ADMIN — VANCOMYCIN HYDROCHLORIDE 1750 MG: 10 INJECTION, POWDER, LYOPHILIZED, FOR SOLUTION INTRAVENOUS at 00:04

## 2020-04-10 RX ADMIN — INSULIN ASPART 2 UNITS: 100 INJECTION, SOLUTION INTRAVENOUS; SUBCUTANEOUS at 13:03

## 2020-04-10 RX ADMIN — OXYCODONE HYDROCHLORIDE 5 MG: 5 TABLET ORAL at 06:20

## 2020-04-10 RX ADMIN — SENNOSIDES AND DOCUSATE SODIUM 1 TABLET: 8.6; 5 TABLET ORAL at 08:17

## 2020-04-10 RX ADMIN — GABAPENTIN 300 MG: 300 CAPSULE ORAL at 08:17

## 2020-04-10 RX ADMIN — SENNOSIDES AND DOCUSATE SODIUM 2 TABLET: 8.6; 5 TABLET ORAL at 20:14

## 2020-04-10 ASSESSMENT — ACTIVITIES OF DAILY LIVING (ADL)
ADLS_ACUITY_SCORE: 11
ADLS_ACUITY_SCORE: 11
PREVIOUS_RESPONSIBILITIES: MEAL PREP;HOUSEKEEPING;LAUNDRY;SHOPPING;YARDWORK;MEDICATION MANAGEMENT;FINANCES;DRIVING;WORK
ADLS_ACUITY_SCORE: 11

## 2020-04-10 NOTE — PLAN OF CARE
PT: Orders received. PT evaluation completed and treatment initiated. Pt reports living in a split level home with his spouse and children. Pt reports indep with all mobility at baseline and does not own any assistive devices.     Discharge Planner PT   Patient plan for discharge: Home  Current status: Pt supine upon initiation, educated in PT role and pt agreeable to session.  used throughout. This writer managing wound vac throughout. Pt noted to be resting with knee flexed, and with cuing was able to obtain full extension. Pt completes supine>sit with SBA, cues for technique. Pt completes sit<>stand with SBA and cues for safe technique. Pt ambulates ~30' with use of FWW and CGA with cues to WB through L LE as pt is NWB throughout. Attempted to encourage normal heel-toe walking, but pt continues NWB/TTWB. Pt has one minor LOB when backing up to bedside chair that he is able to self correct with assist of walker. Pt instructed to go slow when backing up to seated surfaces and to wait until he can feel the surface against the back of his legs before sitting. Pt sitting in bedside chair upon completion of session with all needs in reach. RN updated.   Barriers to return to prior living situation: Decreased activity tolerance, impaired gait pattern, stairs to enter home  Recommendations for discharge: Home with HHPT and assist for stair management  Rationale for recommendations: Anticipate that with continued IP PT the pt will be able to complete all basic mobility skills required for safe discharge home. Rec continued PT after discharge to ensure pt returns to normalized gait pattern, therefore reducing likelihood of further injury to L LE. HHPT indicated as leaving the home would be taxing on the pt.        Entered by: Nataliia Madrid 04/10/2020 10:45 AM

## 2020-04-10 NOTE — PLAN OF CARE
"/56 (BP Location: Right arm)   Pulse 76   Temp 97.7  F (36.5  C) (Temporal)   Resp 16   Ht 1.651 m (5' 5\")   Wt 82.3 kg (181 lb 6.4 oz)   SpO2 96%   BMI 30.19 kg/m    Pt is A&Ox4, Bulgarian speaking, LS clear on RA capno. Pt has wound vac inplace w/ minimal output. Wound looks good, ace wrap in place w/ breaks Dressings CDI. Denies N/V, N/T, SOB, CP. Pain is well managed currently 0/10 w/ tylenol and ice.. Up w/ assist x 1 w/ walker, GB, and wound vac. Pt is not putting wieght on L leg, needs encouragement. Pt is voiding well, passing gas, no BM. DM2, , 221, 222 not well controlled at home. Started carb counting with meals. On IV Vanco and Rocephin. Plan is IV antibiotics and I&D tomorrow morning.  "

## 2020-04-10 NOTE — PROGRESS NOTES
Orthopedic Surgery  Kris Moeller .  4/10/2020  Admit Date:  2020  POD 1 Day Post-Op  S/P Procedure(s):  1.  Irrigation and debridement phlegmon left posterolateral thigh involving skin, subcutaneous tissue and fascia through an incision measuring 6 x 6 cm in length.  2.  Placement of wound VAC.    Patient resting comfortably in chair.    Pain controlled.  Tolerating oral intake.    Denies nausea or vomiting  Denies chest pain or shortness of breath  No events overnight.     Alert and orient to person, place, and time. [place] speaking but does communicate and understand most English  Vital Sign Ranges  Temperature Temp  Av.3  F (36.8  C)  Min: 97.6  F (36.4  C)  Max: 99.7  F (37.6  C)   Blood pressure Systolic (24hrs), Av , Min:107 , Max:154        Diastolic (24hrs), Av, Min:56, Max:85      Pulse Pulse  Av.5  Min: 71  Max: 97   Respirations Resp  Av.2  Min: 11  Max: 17   Pulse oximetry SpO2  Av.4 %  Min: 94 %  Max: 98 %       Dressing is clean, dry, and intact.   ACE removed  Wound vac functioning - mild amount of bloody drainage in cannister (low output overall)  Minimal erythema of the surrounding skin.   TTP popliteal fossa  Bilateral calves are soft, non-tender.  Bilateral lower extremity is NVI.  Sensation intact bilateral lower extremities  5/5 motor with resisted dorsi and plantar flexion bilaterally, no pain  +Dp pulse      Labs:  Recent Labs   Lab Test 20  0620 20  0659 20  1323   POTASSIUM 3.8 4.4 4.3     Recent Labs   Lab Test 04/10/20  0614 20  0620 20  0659   HGB 11.1* 11.8* 12.7*     Recent Labs   Lab Test 17  0621   INR 0.95     Recent Labs   Lab Test 04/10/20  0614 20  0620 20  0659    312 304       A/P  1. S/p left thigh abscess I&D   Continue ASA, Amb and SCDs for DVT prophylaxis.     Mobilize with PT/OT    WBAT.    Leave wound vac in place   Replace ACE at lunch time from base of toes to mid thigh     Continue  current pain regiment.   Plan for repeat I&D with possible closure of wound vs replacement of wound vac tomorrow 8 AM 4/11   NPO after midnight    2. Disposition   Anticipate d/c to home based on improvement of wound/op findings, Abx plan and placement.    Jeannette Neumann PA-C

## 2020-04-10 NOTE — PROGRESS NOTES
04/10/20 1527   Quick Adds   Type of Visit Initial Occupational Therapy Evaluation   Living Environment   Lives With spouse;child(bladimir), dependent   Living Arrangements house   Home Accessibility stairs to enter home;stairs within home   Number of Stairs, Main Entrance 3   Stair Railings, Main Entrance none   Number of Stairs, Within Home, Primary 8   Stair Railings, Within Home, Primary railing on left side (ascending)   Transportation Anticipated family or friend will provide   Living Environment Comment Pt lives with spouse and children in split level house, stairs to enter/within, walk in shower, standard height toilet   Self-Care   Usual Activity Tolerance good   Current Activity Tolerance moderate   Regular Exercise No   Equipment Currently Used at Home none   Functional Level   Ambulation 0-->independent   Transferring 0-->independent   Toileting 0-->independent   Bathing 0-->independent   Dressing 0-->independent   Eating 0-->independent   Communication 0-->understands/communicates without difficulty   Swallowing 0-->swallows foods/liquids without difficulty   Cognition 0 - no cognition issues reported   Fall history within last six months no   Which of the above functional risks had a recent onset or change? transferring;toileting;bathing;dressing   Prior Functional Level Comment Pt reports indep in all ADLs, IADLs and mobility tasks with no AD at baseline.    General Information   Onset of Illness/Injury or Date of Surgery - Date 04/07/20   Referring Physician Leona Guzman PA-C   Patient/Family Goals Statement Pt's goal is to d/c home   Additional Occupational Profile Info/Pertinent History of Current Problem Per chart: Pt is a 45 year old male s/p L thigh I&D with wound vac placement.   Precautions/Limitations fall precautions   Weight-Bearing Status - LLE weight-bearing as tolerated   Sensory Examination   Sensory Comments Pt denies numbness/tingling in BUEs   Pain Assessment   Patient  Currently in Pain No   Range of Motion (ROM)   ROM Comment BUEs WFL   Strength   Strength Comments BUEs WFL   Hand Strength   Hand Strength Comments WFL   Bed Mobility Skill: Sit to Supine   Level of [place]: Sit/Supine stand-by assist   Physical Assist/Nonphysical Assist: Sit/Supine 1 person assist   Bed Mobility Skill: Supine to Sit   Level of [place]: Supine/Sit stand-by assist   Physical Assist/Nonphysical Assist: Supine/Sit 1 person assist   Transfer Skill: Bed to Chair/Chair to Bed   Level of [place]: Bed to Chair contact guard   Physical Assist/Nonphysical Assist: Bed to Chair 1 person assist   Weight-Bearing Restrictions weight-bearing as tolerated   Assistive Device - Transfer Skill Bed to Chair Chair to Bed Rehab Eval rolling walker   Transfer Skill: Sit to Stand   Level of [place]: Sit/Stand contact guard   Physical Assist/Nonphysical Assist: Sit/Stand 1 person assist   Transfer Skill: Sit to Stand weight-bearing as tolerated   Assistive Device for Transfer: Sit/Stand rolling walker   Transfer Skill: Toilet Transfer   Level of [place]: Toilet contact guard   Physical Assist/Nonphysical Assist: Toilet 1 person assist   Weight-Bearing Restrictions: Toilet weight-bearing as tolerated   Assistive Device rolling walker   Balance   Balance Comments CGA provided while in stance, 1 small LOB when turning to sit on toilet. Pt avoiding WB through LLE despite vcs for WBAT   Upper Body Dressing   Level of [place]: Dress Upper Body stand-by assist   Physical Assist/Nonphysical Assist: Dress Upper Body 1 person assist   Lower Body Dressing   Level of [place]: Dress Lower Body moderate assist (50% patients effort)   Physical Assist/Nonphysical Assist: Dress Lower Body 1 person assist   Toileting   Level of [place]: Toilet contact guard   Physical Assist/Nonphysical Assist: Toilet 1 person assist   Grooming   Level of [place]: Grooming contact guard   Physical  "Assist/Nonphysical Assist: Grooming 1 person assist   Instrumental Activities of Daily Living (IADL)   Previous Responsibilities meal prep;housekeeping;laundry;shopping;yardwork;medication management;finances;driving;work   IADL Comments Pt has support of family in home environment   Activities of Daily Living Analysis   Impairments Contributing to Impaired Activities of Daily Living balance impaired;post surgical precautions   ADL Comments Pt presents to OT below baseline level of functioning with regards to ADLs   General Therapy Interventions   Planned Therapy Interventions ADL retraining;IADL retraining;strengthening;transfer training   Clinical Impression   Criteria for Skilled Therapeutic Interventions Met yes, treatment indicated   OT Diagnosis Impaired ADLs, IADLs and mobility tasks   Influenced by the following impairments Decreased functional activity tolerance, discomfort   Assessment of Occupational Performance 5 or more Performance Deficits   Identified Performance Deficits Bathing, dressing, grooming, toileting, homemaking, transfers   Clinical Decision Making (Complexity) Low complexity   Therapy Frequency Daily   Predicted Duration of Therapy Intervention (days/wks) 3 days   Anticipated Discharge Disposition Home with Assist   Risks and Benefits of Treatment have been explained. Yes   Patient, Family & other staff in agreement with plan of care Yes   Clinical Impression Comments Pt would benefit from skilled OT to maximize safety and indep in all ADLs, IADLs and mobility tasks due to current deficits impacting function    [facility] AM-PAC  \"6 Clicks\" Daily Activity Inpatient Short Form   1. Putting on and taking off regular lower body clothing? 2 - A Lot   2. Bathing (including washing, rinsing, drying)? 3 - A Little   3. Toileting, which includes using toilet, bedpan or urinal? 3 - A Little   4. Putting on and taking off regular upper body clothing? 3 - A Little   5. Taking care of personal " grooming such as brushing teeth? 3 - A Little   6. Eating meals? 4 - None   Daily Activity Raw Score (Score out of 24.Lower scores equate to lower levels of function) 18   Total Evaluation Time   Total Evaluation Time (Minutes) 10

## 2020-04-10 NOTE — PROGRESS NOTES
"[facility]  Infectious Disease Progress Note          Assessment and Plan:   IMPRESSION:   1.  A 45-year-old male with subacute worsening left knee area abscess, mild systemic sepsis with cultures pending.   2.  Prior history of some recurrent skin lesions treated with Bactrim in the past, but no history of MRSA.   3.  Poorly controlled diabetes mellitus.      RECOMMENDATIONS:   1.  Op report pending , await cx, gram stain GPC, 4/9 fever spike BC neg  2.  Ceftriaxone and vancomycin.  This is likely Staph aureus and certainly possible MRSA, although he has not had that historically.  Amount of antibiotics depending on course , await cx and  Possibly oral therapy relatively early postop OK.              Interval History:   no new complaints and doing well; no cp, sob, n/v/d, or abd pain. Typical necrotic abscess MRI findings cx pending T down but 102 spike early 4/9, BC neg              Medications:       acetaminophen  975 mg Oral Q8H     aspirin  325 mg Oral Daily     cefTRIAXone  1 g Intravenous Q24H     gabapentin  300 mg Oral BID     insulin aspart   Subcutaneous TID w/meals     insulin aspart  1-7 Units Subcutaneous TID AC     insulin aspart  1-5 Units Subcutaneous At Bedtime     insulin detemir  40 Units Subcutaneous At Bedtime     senna-docusate  1 tablet Oral BID    Or     senna-docusate  2 tablet Oral BID     sodium chloride (PF)  3 mL Intracatheter Q8H     sodium chloride (PF)  3 mL Intracatheter Q8H     vancomycin (VANCOCIN) IV  1,750 mg Intravenous Q12H                  Physical Exam:   Blood pressure 107/56, pulse 76, temperature 97.7  F (36.5  C), temperature source Temporal, resp. rate 16, height 1.651 m (5' 5\"), weight 82.3 kg (181 lb 6.4 oz), SpO2 96 %.  Wt Readings from Last 2 Encounters:   04/09/20 82.3 kg (181 lb 6.4 oz)   10/03/17 85.3 kg (188 lb)     Vital Signs with Ranges  Temp:  [97.6  F (36.4  C)-99.7  F (37.6  C)] 97.7  F (36.5  C)  Pulse:  [71-97] 76  Heart Rate:  " [85-91] 87  Resp:  [11-17] 16  BP: (107-154)/(56-85) 107/56  SpO2:  [94 %-98 %] 96 %    Constitutional: Awake, alert, cooperative, no apparent distress   Lungs: Clear to auscultation bilaterally, no crackles or wheezing   Cardiovascular: Regular rate and rhythm, normal S1 and S2, and no murmur noted   Abdomen: Normal bowel sounds, soft, non-distended, non-tender   Skin: No rashes, no cyanosis, no edema   Other:           Data:   All microbiology laboratory data reviewed.  Recent Labs   Lab Test 04/10/20  0614 04/09/20  0620 04/08/20  0659   WBC 16.1* 17.0* 16.6*   HGB 11.1* 11.8* 12.7*   HCT 34.0* 36.6* 38.8*   MCV 88 87 88    312 304     Recent Labs   Lab Test 04/10/20  0614 04/09/20  0620 04/08/20  0659   CR 0.82 1.00 1.08     Recent Labs   Lab Test 04/07/20  1519   SED 40*     Recent Labs   Lab Test 04/09/20  0839 04/07/20  1518 04/07/20  1323 01/12/17  1212 01/12/17  0843 01/12/17  0839   CULT Culture negative monitoring continues  Culture in progress No growth after 3 days No growth after 3 days No growth Cultured on the 2nd day of incubation: Micrococcus species  Critical Value/Significant Value, preliminary result only, called to and read   back by Laci MA (ER Nurse ) at 1337 on 1.14.17, cn.  (Note)  NEGATIVE for the following: Staphylococcus spp., Staph aureus, Staph  epidermidis, Staph lugdunensis, Streptococcus spp., Strep pneumoniae,  Strep pyogenes, Strep agalactiae, Strep anginosus group, Enterococcus  faecalis, Enterococcus faecium, and Listeria spp. by [employer]  multiplex nucleic acid test. Final identification and antimicrobial  susceptibility testing will be verified by standard methods.    Critical Value/Significant Value called to and read back by ANDRAE ESTRADA RN (OBSERVATION) @3749 1/14/17. CT  * No growth

## 2020-04-10 NOTE — PROGRESS NOTES
04/10/20 1037   Quick Adds   Type of Visit Initial PT Evaluation   Living Environment   Lives With spouse;child(bladimir), dependent   Living Arrangements house   Home Accessibility stairs to enter home;stairs within home   Number of Stairs, Main Entrance 3   Stair Railings, Main Entrance none   Number of Stairs, Within Home, Primary 8  (split level)   Stair Railings, Within Home, Primary railing on left side (ascending)   Transportation Anticipated family or friend will provide   Self-Care   Usual Activity Tolerance good   Current Activity Tolerance moderate   Regular Exercise No   Equipment Currently Used at Home none   Functional Level Prior   Ambulation 0-->independent   Transferring 0-->independent   Toileting 0-->independent   Bathing 0-->independent   Fall history within last six months no   Which of the above functional risks had a recent onset or change? ambulation;transferring;toileting;bathing;dressing   General Information   Onset of Illness/Injury or Date of Surgery - Date 04/09/20   Referring Physician Leona Guzman PA-C    Patient/Family Goals Statement Discharge home   Pertinent History of Current Problem (include personal factors and/or comorbidities that impact the POC) Kris Moeller  is a 45 year old male with history of poorly controlled IDDM type II and HLD who was admitted on 4/7/2020 with left lateral upper leg pain, erythema, swelling, and drainage from a pustule that he picked it consistent with cellulitis and possible abscess.  Febrile to 100  F.  Leukocytosis at 16.9 and CRP elevated at 110/ESR 40.  Started on IV ceftriaxone.  ID consulted and added IV vancomycin.  Orthopedic surgery consulted.  MRI of the left thigh did not show any discrete abscess, but did show intramuscular edema in the vastus lateralis and short biceps.  Underwent I&D 4/9 that showed a phlegmon involving the subcutaneous tissue, wound left open and wound VAC placed.  Intra-Op cultures were obtained, Gram stain  showing GPC's.  Continuing IV antibiotics and adjusting insulin daily.    Precautions/Limitations fall precautions   Weight-Bearing Status - LLE weight-bearing as tolerated   Weight-Bearing Status - RLE full weight-bearing   General Observations Pt supine upon initiation, agreeable to session.    Cognitive Status Examination   Orientation orientation to person, place and time   Level of Consciousness alert   Follows Commands and Answers Questions 100% of the time   Personal Safety and Judgment intact   Memory intact   Pain Assessment   Patient Currently in Pain Yes, see Vital Sign flowsheet   Integumentary/Edema   Integumentary/Edema Comments Ace wrap intact to L LE   Posture    Posture Forward head position;Protracted shoulders   Range of Motion (ROM)   ROM Comment L knee resting flexed, is able to fully extend when cued   Strength   Strength Comments Able to SLR B LE   Bed Mobility   Bed Mobility Comments sit<>supine SBA   Transfer Skills   Transfer Comments Sit<>stand with SBA   Gait   Gait Comments CGA with FWW   Balance   Balance Comments Requires B UE support for safe dynamic mobiltiy   Sensory Examination   Sensory Perception no deficits were identified   Coordination   Coordination no deficits were identified   Muscle Tone   Muscle Tone no deficits were identified   Modality Interventions   Planned Modality Interventions Cryotherapy   Planned Modality Interventions Comments Ice PRN   General Therapy Interventions   Planned Therapy Interventions bed mobility training;gait training;ROM;strengthening;stretching;transfer training;home program guidelines;progressive activity/exercise   Clinical Impression   Criteria for Skilled Therapeutic Intervention yes, treatment indicated   PT Diagnosis Impaired functional mobility   Influenced by the following impairments Pain, impaired balance, impaired gait pattern   Functional limitations due to impairments Difficulty with bed mobility, transfers, ambulation, stairs  "  Clinical Presentation Stable/Uncomplicated   Clinical Presentation Rationale Clear POC, clear DC plan   Clinical Decision Making (Complexity) Low complexity   Therapy Frequency Daily   Predicted Duration of Therapy Intervention (days/wks) 5 days   Anticipated Equipment Needs at Discharge walker   Anticipated Discharge Disposition Home with Assist;Home with Home Therapy   Risk & Benefits of therapy have been explained Yes   Patient, Family & other staff in agreement with plan of care Yes   [facility] TM \"6 Clicks\"   2016, Trustees of [facility], under license to [employer].  All rights reserved.   6 Clicks Short Forms Basic Mobility Inpatient Short Form   [facility] AM-PAC  \"6 Clicks\" V.2 Basic Mobility Inpatient Short Form   1. Turning from your back to your side while in a flat bed without using bedrails? 3 - A Little   2. Moving from lying on your back to sitting on the side of a flat bed without using bedrails? 3 - A Little   3. Moving to and from a bed to a chair (including a wheelchair)? 3 - A Little   4. Standing up from a chair using your arms (e.g., wheelchair, or bedside chair)? 3 - A Little   5. To walk in hospital room? 3 - A Little   6. Climbing 3-5 steps with a railing? 3 - A Little   Basic Mobility Raw Score (Score out of 24.Lower scores equate to lower levels of function) 18   Total Evaluation Time   Total Evaluation Time (Minutes) 8     "

## 2020-04-10 NOTE — PROGRESS NOTES
[facility]  Hospitalist Progress Note  Ken Li MD 04/10/20    Reason for Stay (Diagnosis): left leg cellulitis, phlegmon         Assessment and Plan:      Summary of Stay: Kris Moeller Sr. is a 45 year old male with history of poorly controlled IDDM type II and HLD who was admitted on 4/7/2020 with left lateral upper leg pain, erythema, swelling, and drainage from a pustule that he picked it consistent with cellulitis and possible abscess.  Febrile to 100  F.  Leukocytosis at 16.9 and CRP elevated at 110/ESR 40.  Started on IV ceftriaxone.  ID consulted and added IV vancomycin.  Orthopedic surgery consulted.  MRI of the left thigh did not show any discrete abscess, but did show intramuscular edema in the vastus lateralis and short biceps.  Underwent I&D 4/9 that showed a phlegmon involving the subcutaneous tissue, wound left open and wound VAC placed.  Intra-Op cultures were obtained, Gram stain showing GPC's.  Continuing IV antibiotics and adjusting insulin daily.  Anticipate OR tomorrow with possible wound closure.  N.p.o. at midnight.    Problem List/Assessment and Plan:   Left lateral thigh cellulitis, phlegmon: Patient had a small pustule on the lateral aspect of the thigh near his knee which he picked at 2 weeks ago.  This became red and swollen and has drained since then.  Now has an eschar over it.  Fevers at home along with chills.  Intermittent fevers here up to 102  F.  Leukocytosis of 16.9 elevated inflammatory markers with /ESR 40.  MRI of the left thigh did not show any discrete abscess, but did show intramuscular edema in the vastus lateralis and short biceps.  -ID consulted  -Continue IV vancomycin and ceftriaxone  -Orthopedics consulted. Underwent I&D 4/9 that showed a phlegmon involving the subcutaneous tissue, wound left open and wound VAC placed.  Per previous notes plan for OR tomorrow with possible wound closure.  Intra-Op cultures were obtained, gram stain shows  "GPC's  -Acetaminophen and oxycodone as needed for pain control  -Elevate left leg  -Blood cultures NGTD    Poorly controlled IDDM type II: PTA on Levemir 45 units at bedtime along with aspart 15 units twice daily with meals and metformin 1000 mg twice daily with meals.  Also takes 81 mg aspirin.  Poorly controlled in the past.  A1c is 12.3  -Metformin held due to acute infection  -Decrease Levemir dose to 40 units at bedtime in anticipation of n.p.o. status at midnight  -Medium dose sliding scale insulin.  Add 1 unit of aspart per 15 g carbohydrate today with meals    HLD: Not currently on any medications for this, follow-up with PCP.      DVT Prophylaxis: Pneumatic Compression Devices  Code Status: Full Code  FEN: Consistent carbohydrate moderate.  N.p.o. at midnight.  No IV fluids needed  Discharge Dispo: Home  Estimated Disch Date / # of Days until Disch: Per previous orthopedics note likely OR tomorrow and possible wound closure.  If wound culture results available may be able to discharge tomorrow or next day        Interval History (Subjective):      Afebrile overnight after I&D.  Denies any chills.  Tolerating regular diet.  No shortness of breath.  Mild left leg pain at the area of the incision.  Denies numbness or tingling in the left foot.    Entire encounter completed assistance of professional telephone .                  Physical Exam:      Last Vital Signs:  /56 (BP Location: Right arm)   Pulse 76   Temp 97.7  F (36.5  C) (Temporal)   Resp 16   Ht 1.651 m (5' 5\")   Wt 82.3 kg (181 lb 6.4 oz)   SpO2 96%   BMI 30.19 kg/m      Intake/Output Summary (Last 24 hours) at 4/10/2020 0923  Last data filed at 4/10/2020 0822  Gross per 24 hour   Intake 240 ml   Output 1110 ml   Net -870 ml       Constitutional: Awake, NAD   Eyes: sclera white   HEENT:  MMM  Respiratory:   No focal crackles or wheeze  Cardiovascular: RRR.  No murmur   GI: non-tender, not distended, bowel sounds " present  Skin/Musculoskeletal/extremities: left leg ace wrapped, trace LLE edema  Neurologic: A&O   Psychiatric: calm, cooperative          Medications:      All current medications were reviewed with changes reflected in problem list.         Data:      All new lab and imaging data was reviewed.   Labs:  Recent Labs   Lab 04/09/20  0839 04/07/20  1518 04/07/20  1323   CULT Culture negative monitoring continues  Culture in progress No growth after 3 days No growth after 3 days     Recent Labs   Lab 04/10/20  0614 04/09/20  0620 04/08/20  0659 04/07/20  1323   NA  --  135 134 133   POTASSIUM  --  3.8 4.4 4.3   CHLORIDE  --  104 105 103   CO2  --  27 25 27   ANIONGAP  --  4 4 3   * 154* 220* 295*   BUN  --  19 21 26   CR 0.82 1.00 1.08 1.22   GFRESTIMATED >90 90 82 71   GFRESTBLACK >90 >90 >90 82   KHALIDA  --  8.5 8.5 8.5     Recent Labs   Lab 04/10/20  0614 04/09/20  0620 04/08/20  0659   WBC 16.1* 17.0* 16.6*   HGB 11.1* 11.8* 12.7*   HCT 34.0* 36.6* 38.8*   MCV 88 87 88    312 304      Imaging:   None today      Ken Li MD

## 2020-04-10 NOTE — PLAN OF CARE
"OT: Order received, eval completed and treatment initiated. Pt is a 45 year old male s/p L thigh I&D with wound vac placement. Pt lives with spouse and children in split level house, stairs to enter/within, walk in shower, standard height toilet. Pt reports indep in all ADLs, IADLs and mobility tasks with no AD at baseline.    Discharge Planner OT   Patient plan for discharge: Home  Current status: Pt completed bed mobility supine <> sit EOB with SBA. Pt completed sit > stand from EOB to FWW with CGA, ambulated to/from BR FWW level with CGA/SBA and assist for wound vac and IV line mgmt. Pt noted to be avoiding WB through LLE, educated and encouraged WBAT. Pt with 1 small LOB while turning to toilet, min A to correct. Pt completed toilet transfer on/off with CGA. Pt able to don R sock with set up, required assist for mgmt of L sock - educated on compensatory technique for ease of LB dressing, verbalized understanding. Pt completed toileting tasks with CGA. Pt educated on home safety modifications/recommendations, DME/AE and safe activities post LE procedure, verbalized understanding. Pt reported \"a little\" pain after activity.   Barriers to return to prior living situation: Stairs, LLE discomfort, impaired balance due to avoiding WB through LLE  Recommendations for discharge: Home w/ spouse assist for LB dressing, bathing and IADLs (homemaking, laundry, etc)  Rationale for recommendations: Anticipate pt will progress to meet IP OT goals for safe return home. Pt has good support in place in home environment.        Entered by: Amanda Tracy 04/10/2020 4:02 PM       "

## 2020-04-10 NOTE — PHARMACY-VANCOMYCIN DOSING SERVICE
Pharmacy Vancomycin Note  Date of Service April 10, 2020  Patient's  1975   45 year old, male    Indication: Abscess  Goal Trough Level: 15-20 mg/L  Day of Therapy: 2  Current Vancomycin regimen:  1750 mg IV q12h    Current estimated CrCl = Estimated Creatinine Clearance: 91.1 mL/min (based on SCr of 1 mg/dL).    Creatinine for last 3 days  2020:  1:23 PM Creatinine 1.22 mg/dL  2020:  6:59 AM Creatinine 1.08 mg/dL  2020:  6:20 AM Creatinine 1.00 mg/dL    Recent Vancomycin Levels (past 3 days)  2020: 11:20 PM Vancomycin Level 16.1 mg/L    Vancomycin IV Administrations (past 72 hours)                   vancomycin (VANCOCIN) 1,750 mg in sodium chloride 0.9 % 500 mL intermittent infusion (mg) 1,750 mg Given 04/10/20 0004     1,750 mg Given 20 1223     1,750 mg Given  0009     1,750 mg Given 20 1307                Nephrotoxins and other renal medications (From now, onward)    Start     Dose/Rate Route Frequency Ordered Stop    20 1121  naproxen (NAPROSYN) tablet 500 mg      500 mg Oral EVERY 12 HOURS PRN 20 1121      20 1200  vancomycin (VANCOCIN) 1,750 mg in sodium chloride 0.9 % 500 mL intermittent infusion      1,750 mg  over 2 Hours Intravenous EVERY 12 HOURS 20 1156               Contrast Orders - past 72 hours (72h ago, onward)    Start     Dose/Rate Route Frequency Ordered Stop    20 1630  gadobutrol (GADAVIST) injection 7.5 mL      7.5 mL Intravenous ONCE 20 1616 20 1634          Interpretation of levels and current regimen:  Trough level is  Therapeutic    Has serum creatinine changed > 50% in last 72 hours: No    Urine output:  good urine output    Renal Function: Stable    Plan:  1.  Continue Current Dose  2.  Pharmacy will check trough levels as appropriate in 1-3 Days.    3. Serum creatinine levels will be ordered daily for the first week of therapy and at least twice weekly for subsequent weeks.      Frank Saldana,  BLAYNE        .

## 2020-04-10 NOTE — CONSULTS
Care Transition Initial Assessment - RN        Met with: Patient.  DATA   Principal Problem:    Cellulitis  Active Problems:    Hyperlipidemia    Type 2 diabetes mellitus, controlled (H)    Diabetes mellitus type II, uncontrolled (H)    Abscess of left thigh       Cognitive Status: awake, alert and oriented.  [facility]   Primary Care MD Name: Sheng Lynne  Contact information and PCP information verified: Yes  Lives With: spouse, child(bladimir), dependent                     Insurance concerns: Insurance questions referred to Financial Services as he has NO INSURANCE  ASSESSMENT  Patient currently receives the following services:  none        Identified issues/concerns regarding health management: pt admitted with   Left leg abcess and A1C of 12.3. Pt states he checks his blood sugars 2 times a day but follows a regular diet. He has not been to the doctor and a couple years. Admits to needing diabetic diet recommendations and agreeable to follow up with his PCP. Pt states he has testing supplies and insulin at home. CM attached diabetic diet resources to AVS in Cambodian per pts request and consulted Nutrition for diabetic meal management.   PLAN  Financial costs for the patient include no insurance .  Patient anticipates discharging to home .        Patient anticipates needs for home equipment: No    Plan/Disposition: Home   Appointments: PCP hospital follow up 4/20 Monday [facility] with Sheng Lynne at 1500.      Care  (CTS) will continue to follow as needed.    Triny Yeung RN, BSN CTS  Care Coordinator  [facility]   [phone]

## 2020-04-10 NOTE — PLAN OF CARE
Evening RN   Pt A/O x4.  VSS and afebrile.  Capnography in place and wdl.  Pain managed adequately with scheduled PO Tylenol and gabapentin, has declined further pain medication intervention.  CMS intact - baseline cool extremities and difficult to palpate pedal pulses - utilized doppler to successfully locate.  ACE dressing CDI.  Wound Vac WDL to 125 - no output this shift.  Up A1 with walker and gait belt - hesitant to put weight on LLE but knows that he is WBAT.  Voiding adequately.  Tolerating regular diet well.   and 303 - diabetic txs continued.  Vanco and rocephin as antibiotics.  IV TKO.  Will continue to monitor.

## 2020-04-11 ENCOUNTER — ANESTHESIA (OUTPATIENT)
Dept: SURGERY | Facility: CLINIC | Age: 45
End: 2020-04-11
Payer: MEDICAID

## 2020-04-11 ENCOUNTER — APPOINTMENT (OUTPATIENT)
Dept: PHYSICAL THERAPY | Facility: CLINIC | Age: 45
End: 2020-04-11
Payer: MEDICAID

## 2020-04-11 PROBLEM — L02.419 THIGH ABSCESS: Status: ACTIVE | Noted: 2020-04-11

## 2020-04-11 LAB
ANION GAP SERPL CALCULATED.3IONS-SCNC: 4 MMOL/L (ref 3–14)
BUN SERPL-MCNC: 20 MG/DL (ref 7–30)
CALCIUM SERPL-MCNC: 8.3 MG/DL (ref 8.5–10.1)
CHLORIDE SERPL-SCNC: 111 MMOL/L (ref 94–109)
CO2 SERPL-SCNC: 25 MMOL/L (ref 20–32)
CREAT SERPL-MCNC: 0.92 MG/DL (ref 0.66–1.25)
ERYTHROCYTE [DISTWIDTH] IN BLOOD BY AUTOMATED COUNT: 11.9 % (ref 10–15)
GFR SERPL CREATININE-BSD FRML MDRD: >90 ML/MIN/{1.73_M2}
GLUCOSE BLDC GLUCOMTR-MCNC: 106 MG/DL (ref 70–99)
GLUCOSE BLDC GLUCOMTR-MCNC: 181 MG/DL (ref 70–99)
GLUCOSE BLDC GLUCOMTR-MCNC: 217 MG/DL (ref 70–99)
GLUCOSE BLDC GLUCOMTR-MCNC: 379 MG/DL (ref 70–99)
GLUCOSE BLDC GLUCOMTR-MCNC: 78 MG/DL (ref 70–99)
GLUCOSE BLDC GLUCOMTR-MCNC: 86 MG/DL (ref 70–99)
GLUCOSE SERPL-MCNC: 83 MG/DL (ref 70–99)
HCT VFR BLD AUTO: 34.6 % (ref 40–53)
HGB BLD-MCNC: 10.8 G/DL (ref 13.3–17.7)
MCH RBC QN AUTO: 28.6 PG (ref 26.5–33)
MCHC RBC AUTO-ENTMCNC: 31.2 G/DL (ref 31.5–36.5)
MCV RBC AUTO: 92 FL (ref 78–100)
PLATELET # BLD AUTO: 329 10E9/L (ref 150–450)
POTASSIUM SERPL-SCNC: 4 MMOL/L (ref 3.4–5.3)
RBC # BLD AUTO: 3.77 10E12/L (ref 4.4–5.9)
SODIUM SERPL-SCNC: 140 MMOL/L (ref 133–144)
WBC # BLD AUTO: 12.5 10E9/L (ref 4–11)

## 2020-04-11 PROCEDURE — 0JDM0ZZ EXTRACTION OF LEFT UPPER LEG SUBCUTANEOUS TISSUE AND FASCIA, OPEN APPROACH: ICD-10-PCS | Performed by: ORTHOPAEDIC SURGERY

## 2020-04-11 PROCEDURE — 25000125 ZZHC RX 250: Performed by: NURSE ANESTHETIST, CERTIFIED REGISTERED

## 2020-04-11 PROCEDURE — 99232 SBSQ HOSP IP/OBS MODERATE 35: CPT | Performed by: INTERNAL MEDICINE

## 2020-04-11 PROCEDURE — 12000000 ZZH R&B MED SURG/OB

## 2020-04-11 PROCEDURE — 25000125 ZZHC RX 250: Performed by: ORTHOPAEDIC SURGERY

## 2020-04-11 PROCEDURE — 25000132 ZZH RX MED GY IP 250 OP 250 PS 637: Performed by: INTERNAL MEDICINE

## 2020-04-11 PROCEDURE — 80048 BASIC METABOLIC PNL TOTAL CA: CPT | Performed by: INTERNAL MEDICINE

## 2020-04-11 PROCEDURE — 25000132 ZZH RX MED GY IP 250 OP 250 PS 637: Performed by: PHYSICIAN ASSISTANT

## 2020-04-11 PROCEDURE — 25000128 H RX IP 250 OP 636: Performed by: INTERNAL MEDICINE

## 2020-04-11 PROCEDURE — 27210794 ZZH OR GENERAL SUPPLY STERILE: Performed by: ORTHOPAEDIC SURGERY

## 2020-04-11 PROCEDURE — 25800030 ZZH RX IP 258 OP 636: Performed by: ANESTHESIOLOGY

## 2020-04-11 PROCEDURE — 71000012 ZZH RECOVERY PHASE 1 LEVEL 1 FIRST HR: Performed by: ORTHOPAEDIC SURGERY

## 2020-04-11 PROCEDURE — 25000128 H RX IP 250 OP 636: Performed by: NURSE ANESTHETIST, CERTIFIED REGISTERED

## 2020-04-11 PROCEDURE — 85027 COMPLETE CBC AUTOMATED: CPT | Performed by: INTERNAL MEDICINE

## 2020-04-11 PROCEDURE — 25000128 H RX IP 250 OP 636: Performed by: PHYSICIAN ASSISTANT

## 2020-04-11 PROCEDURE — 37000008 ZZH ANESTHESIA TECHNICAL FEE, 1ST 30 MIN: Performed by: ORTHOPAEDIC SURGERY

## 2020-04-11 PROCEDURE — 25000128 H RX IP 250 OP 636: Performed by: ORTHOPAEDIC SURGERY

## 2020-04-11 PROCEDURE — 36415 COLL VENOUS BLD VENIPUNCTURE: CPT | Performed by: INTERNAL MEDICINE

## 2020-04-11 PROCEDURE — 97116 GAIT TRAINING THERAPY: CPT | Mod: GP | Performed by: PHYSICAL THERAPIST

## 2020-04-11 PROCEDURE — 25000128 H RX IP 250 OP 636: Performed by: ANESTHESIOLOGY

## 2020-04-11 PROCEDURE — 25800030 ZZH RX IP 258 OP 636: Performed by: ORTHOPAEDIC SURGERY

## 2020-04-11 PROCEDURE — 36000056 ZZH SURGERY LEVEL 3 1ST 30 MIN: Performed by: ORTHOPAEDIC SURGERY

## 2020-04-11 PROCEDURE — 40000306 ZZH STATISTIC PRE PROC ASSESS II: Performed by: ORTHOPAEDIC SURGERY

## 2020-04-11 PROCEDURE — 25000132 ZZH RX MED GY IP 250 OP 250 PS 637: Performed by: ORTHOPAEDIC SURGERY

## 2020-04-11 PROCEDURE — 36000058 ZZH SURGERY LEVEL 3 EA 15 ADDTL MIN: Performed by: ORTHOPAEDIC SURGERY

## 2020-04-11 PROCEDURE — 00000146 ZZHCL STATISTIC GLUCOSE BY METER IP

## 2020-04-11 PROCEDURE — 37000009 ZZH ANESTHESIA TECHNICAL FEE, EACH ADDTL 15 MIN: Performed by: ORTHOPAEDIC SURGERY

## 2020-04-11 RX ORDER — ACETAMINOPHEN 325 MG/1
975 TABLET ORAL EVERY 8 HOURS
Status: DISCONTINUED | OUTPATIENT
Start: 2020-04-11 | End: 2020-04-12 | Stop reason: HOSPADM

## 2020-04-11 RX ORDER — NALOXONE HYDROCHLORIDE 0.4 MG/ML
.1-.4 INJECTION, SOLUTION INTRAMUSCULAR; INTRAVENOUS; SUBCUTANEOUS
Status: DISCONTINUED | OUTPATIENT
Start: 2020-04-11 | End: 2020-04-11

## 2020-04-11 RX ORDER — ONDANSETRON 4 MG/1
4 TABLET, ORALLY DISINTEGRATING ORAL EVERY 6 HOURS PRN
Status: DISCONTINUED | OUTPATIENT
Start: 2020-04-11 | End: 2020-04-11

## 2020-04-11 RX ORDER — DIPHENHYDRAMINE HCL 25 MG
25 CAPSULE ORAL EVERY 6 HOURS PRN
Status: DISCONTINUED | OUTPATIENT
Start: 2020-04-11 | End: 2020-04-12 | Stop reason: HOSPADM

## 2020-04-11 RX ORDER — CEFAZOLIN SODIUM 1 G/3ML
1 INJECTION, POWDER, FOR SOLUTION INTRAMUSCULAR; INTRAVENOUS SEE ADMIN INSTRUCTIONS
Status: DISCONTINUED | OUTPATIENT
Start: 2020-04-11 | End: 2020-04-11 | Stop reason: HOSPADM

## 2020-04-11 RX ORDER — MEPERIDINE HYDROCHLORIDE 50 MG/ML
12.5 INJECTION INTRAMUSCULAR; INTRAVENOUS; SUBCUTANEOUS
Status: DISCONTINUED | OUTPATIENT
Start: 2020-04-11 | End: 2020-04-11 | Stop reason: HOSPADM

## 2020-04-11 RX ORDER — HYDROMORPHONE HYDROCHLORIDE 1 MG/ML
.3-.5 INJECTION, SOLUTION INTRAMUSCULAR; INTRAVENOUS; SUBCUTANEOUS EVERY 10 MIN PRN
Status: DISCONTINUED | OUTPATIENT
Start: 2020-04-11 | End: 2020-04-11 | Stop reason: HOSPADM

## 2020-04-11 RX ORDER — LIDOCAINE 40 MG/G
CREAM TOPICAL
Status: DISCONTINUED | OUTPATIENT
Start: 2020-04-11 | End: 2020-04-11 | Stop reason: HOSPADM

## 2020-04-11 RX ORDER — ONDANSETRON 2 MG/ML
4 INJECTION INTRAMUSCULAR; INTRAVENOUS EVERY 6 HOURS PRN
Status: DISCONTINUED | OUTPATIENT
Start: 2020-04-11 | End: 2020-04-11

## 2020-04-11 RX ORDER — MAGNESIUM HYDROXIDE 1200 MG/15ML
LIQUID ORAL PRN
Status: DISCONTINUED | OUTPATIENT
Start: 2020-04-11 | End: 2020-04-11 | Stop reason: HOSPADM

## 2020-04-11 RX ORDER — DIPHENHYDRAMINE HYDROCHLORIDE 50 MG/ML
25 INJECTION INTRAMUSCULAR; INTRAVENOUS EVERY 6 HOURS PRN
Status: DISCONTINUED | OUTPATIENT
Start: 2020-04-11 | End: 2020-04-12 | Stop reason: HOSPADM

## 2020-04-11 RX ORDER — ONDANSETRON 2 MG/ML
INJECTION INTRAMUSCULAR; INTRAVENOUS PRN
Status: DISCONTINUED | OUTPATIENT
Start: 2020-04-11 | End: 2020-04-11

## 2020-04-11 RX ORDER — CEFAZOLIN SODIUM 2 G/100ML
2 INJECTION, SOLUTION INTRAVENOUS
Status: COMPLETED | OUTPATIENT
Start: 2020-04-11 | End: 2020-04-11

## 2020-04-11 RX ORDER — EPHEDRINE SULFATE 50 MG/ML
INJECTION, SOLUTION INTRAMUSCULAR; INTRAVENOUS; SUBCUTANEOUS PRN
Status: DISCONTINUED | OUTPATIENT
Start: 2020-04-11 | End: 2020-04-11

## 2020-04-11 RX ORDER — AMOXICILLIN 250 MG
2 CAPSULE ORAL 2 TIMES DAILY
Status: DISCONTINUED | OUTPATIENT
Start: 2020-04-11 | End: 2020-04-11

## 2020-04-11 RX ORDER — ONDANSETRON 4 MG/1
4 TABLET, ORALLY DISINTEGRATING ORAL EVERY 30 MIN PRN
Status: DISCONTINUED | OUTPATIENT
Start: 2020-04-11 | End: 2020-04-11 | Stop reason: HOSPADM

## 2020-04-11 RX ORDER — OXYCODONE HYDROCHLORIDE 5 MG/1
5-10 TABLET ORAL
Status: DISCONTINUED | OUTPATIENT
Start: 2020-04-11 | End: 2020-04-12 | Stop reason: HOSPADM

## 2020-04-11 RX ORDER — LIDOCAINE 40 MG/G
CREAM TOPICAL
Status: DISCONTINUED | OUTPATIENT
Start: 2020-04-11 | End: 2020-04-11

## 2020-04-11 RX ORDER — FENTANYL CITRATE 50 UG/ML
25-50 INJECTION, SOLUTION INTRAMUSCULAR; INTRAVENOUS
Status: DISCONTINUED | OUTPATIENT
Start: 2020-04-11 | End: 2020-04-11 | Stop reason: HOSPADM

## 2020-04-11 RX ORDER — SODIUM CHLORIDE, SODIUM LACTATE, POTASSIUM CHLORIDE, CALCIUM CHLORIDE 600; 310; 30; 20 MG/100ML; MG/100ML; MG/100ML; MG/100ML
INJECTION, SOLUTION INTRAVENOUS CONTINUOUS
Status: DISCONTINUED | OUTPATIENT
Start: 2020-04-11 | End: 2020-04-12 | Stop reason: HOSPADM

## 2020-04-11 RX ORDER — HYDROMORPHONE HYDROCHLORIDE 1 MG/ML
.3-.5 INJECTION, SOLUTION INTRAMUSCULAR; INTRAVENOUS; SUBCUTANEOUS
Status: DISCONTINUED | OUTPATIENT
Start: 2020-04-11 | End: 2020-04-12 | Stop reason: HOSPADM

## 2020-04-11 RX ORDER — SODIUM CHLORIDE, SODIUM LACTATE, POTASSIUM CHLORIDE, CALCIUM CHLORIDE 600; 310; 30; 20 MG/100ML; MG/100ML; MG/100ML; MG/100ML
INJECTION, SOLUTION INTRAVENOUS CONTINUOUS
Status: DISCONTINUED | OUTPATIENT
Start: 2020-04-11 | End: 2020-04-11 | Stop reason: HOSPADM

## 2020-04-11 RX ORDER — FENTANYL CITRATE 50 UG/ML
50 INJECTION, SOLUTION INTRAMUSCULAR; INTRAVENOUS ONCE
Status: COMPLETED | OUTPATIENT
Start: 2020-04-11 | End: 2020-04-11

## 2020-04-11 RX ORDER — LABETALOL 20 MG/4 ML (5 MG/ML) INTRAVENOUS SYRINGE
10 ONCE
Status: COMPLETED | OUTPATIENT
Start: 2020-04-11 | End: 2020-04-11

## 2020-04-11 RX ORDER — FENTANYL CITRATE 50 UG/ML
INJECTION, SOLUTION INTRAMUSCULAR; INTRAVENOUS PRN
Status: DISCONTINUED | OUTPATIENT
Start: 2020-04-11 | End: 2020-04-11

## 2020-04-11 RX ORDER — NALOXONE HYDROCHLORIDE 0.4 MG/ML
.1-.4 INJECTION, SOLUTION INTRAMUSCULAR; INTRAVENOUS; SUBCUTANEOUS
Status: DISCONTINUED | OUTPATIENT
Start: 2020-04-11 | End: 2020-04-11 | Stop reason: HOSPADM

## 2020-04-11 RX ORDER — PROPOFOL 10 MG/ML
INJECTION, EMULSION INTRAVENOUS PRN
Status: DISCONTINUED | OUTPATIENT
Start: 2020-04-11 | End: 2020-04-11

## 2020-04-11 RX ORDER — LIDOCAINE HYDROCHLORIDE 10 MG/ML
INJECTION, SOLUTION INFILTRATION; PERINEURAL PRN
Status: DISCONTINUED | OUTPATIENT
Start: 2020-04-11 | End: 2020-04-11

## 2020-04-11 RX ORDER — ONDANSETRON 2 MG/ML
4 INJECTION INTRAMUSCULAR; INTRAVENOUS EVERY 30 MIN PRN
Status: DISCONTINUED | OUTPATIENT
Start: 2020-04-11 | End: 2020-04-11 | Stop reason: HOSPADM

## 2020-04-11 RX ORDER — AMOXICILLIN 250 MG
1 CAPSULE ORAL 2 TIMES DAILY
Status: DISCONTINUED | OUTPATIENT
Start: 2020-04-11 | End: 2020-04-11

## 2020-04-11 RX ORDER — DEXAMETHASONE SODIUM PHOSPHATE 4 MG/ML
INJECTION, SOLUTION INTRA-ARTICULAR; INTRALESIONAL; INTRAMUSCULAR; INTRAVENOUS; SOFT TISSUE PRN
Status: DISCONTINUED | OUTPATIENT
Start: 2020-04-11 | End: 2020-04-11

## 2020-04-11 RX ORDER — CEFAZOLIN SODIUM 2 G/100ML
2 INJECTION, SOLUTION INTRAVENOUS EVERY 8 HOURS
Status: DISCONTINUED | OUTPATIENT
Start: 2020-04-11 | End: 2020-04-12 | Stop reason: HOSPADM

## 2020-04-11 RX ORDER — ACETAMINOPHEN 325 MG/1
650 TABLET ORAL EVERY 4 HOURS PRN
Status: DISCONTINUED | OUTPATIENT
Start: 2020-04-14 | End: 2020-04-12 | Stop reason: HOSPADM

## 2020-04-11 RX ORDER — PROCHLORPERAZINE MALEATE 5 MG
10 TABLET ORAL EVERY 6 HOURS PRN
Status: DISCONTINUED | OUTPATIENT
Start: 2020-04-11 | End: 2020-04-11

## 2020-04-11 RX ORDER — CELECOXIB 200 MG/1
200 CAPSULE ORAL 2 TIMES DAILY
Status: DISCONTINUED | OUTPATIENT
Start: 2020-04-11 | End: 2020-04-12 | Stop reason: HOSPADM

## 2020-04-11 RX ORDER — ACETAMINOPHEN 650 MG/1
650 SUPPOSITORY RECTAL ONCE
Status: DISCONTINUED | OUTPATIENT
Start: 2020-04-11 | End: 2020-04-11 | Stop reason: HOSPADM

## 2020-04-11 RX ADMIN — CELECOXIB 200 MG: 200 CAPSULE ORAL at 20:11

## 2020-04-11 RX ADMIN — DEXAMETHASONE SODIUM PHOSPHATE 4 MG: 4 INJECTION, SOLUTION INTRA-ARTICULAR; INTRALESIONAL; INTRAMUSCULAR; INTRAVENOUS; SOFT TISSUE at 09:46

## 2020-04-11 RX ADMIN — Medication 80 MG: at 09:46

## 2020-04-11 RX ADMIN — FENTANYL CITRATE 50 MCG: 50 INJECTION, SOLUTION INTRAMUSCULAR; INTRAVENOUS at 10:47

## 2020-04-11 RX ADMIN — LIDOCAINE HYDROCHLORIDE 30 MG: 10 INJECTION, SOLUTION INFILTRATION; PERINEURAL at 09:46

## 2020-04-11 RX ADMIN — ACETAMINOPHEN 975 MG: 325 TABLET, FILM COATED ORAL at 03:04

## 2020-04-11 RX ADMIN — Medication 10 MG: at 10:06

## 2020-04-11 RX ADMIN — SODIUM CHLORIDE, POTASSIUM CHLORIDE, SODIUM LACTATE AND CALCIUM CHLORIDE: 600; 310; 30; 20 INJECTION, SOLUTION INTRAVENOUS at 12:37

## 2020-04-11 RX ADMIN — ACETAMINOPHEN 975 MG: 325 TABLET, FILM COATED ORAL at 13:59

## 2020-04-11 RX ADMIN — LABETALOL 20 MG/4 ML (5 MG/ML) INTRAVENOUS SYRINGE 10 MG: at 11:27

## 2020-04-11 RX ADMIN — ACETAMINOPHEN 975 MG: 325 TABLET, FILM COATED ORAL at 21:16

## 2020-04-11 RX ADMIN — FENTANYL CITRATE 50 MCG: 50 INJECTION, SOLUTION INTRAMUSCULAR; INTRAVENOUS at 09:25

## 2020-04-11 RX ADMIN — CEFAZOLIN SODIUM 2 G: 2 INJECTION, SOLUTION INTRAVENOUS at 13:59

## 2020-04-11 RX ADMIN — Medication 10 MG: at 09:50

## 2020-04-11 RX ADMIN — CEFAZOLIN SODIUM 2 G: 2 INJECTION, SOLUTION INTRAVENOUS at 21:16

## 2020-04-11 RX ADMIN — SENNOSIDES AND DOCUSATE SODIUM 2 TABLET: 8.6; 5 TABLET ORAL at 20:11

## 2020-04-11 RX ADMIN — MIDAZOLAM 2 MG: 1 INJECTION INTRAMUSCULAR; INTRAVENOUS at 09:41

## 2020-04-11 RX ADMIN — PROPOFOL 200 MG: 10 INJECTION, EMULSION INTRAVENOUS at 09:46

## 2020-04-11 RX ADMIN — ONDANSETRON HYDROCHLORIDE 4 MG: 2 INJECTION, SOLUTION INTRAVENOUS at 09:51

## 2020-04-11 RX ADMIN — CEFAZOLIN SODIUM 2 G: 2 INJECTION, SOLUTION INTRAVENOUS at 09:41

## 2020-04-11 RX ADMIN — SODIUM CHLORIDE, POTASSIUM CHLORIDE, SODIUM LACTATE AND CALCIUM CHLORIDE: 600; 310; 30; 20 INJECTION, SOLUTION INTRAVENOUS at 09:23

## 2020-04-11 RX ADMIN — FENTANYL CITRATE 100 MCG: 50 INJECTION, SOLUTION INTRAMUSCULAR; INTRAVENOUS at 09:46

## 2020-04-11 RX ADMIN — GABAPENTIN 300 MG: 300 CAPSULE ORAL at 20:11

## 2020-04-11 RX ADMIN — FENTANYL CITRATE 50 MCG: 50 INJECTION, SOLUTION INTRAMUSCULAR; INTRAVENOUS at 11:03

## 2020-04-11 RX ADMIN — OXYCODONE HYDROCHLORIDE 5 MG: 5 TABLET ORAL at 00:07

## 2020-04-11 SDOH — HEALTH STABILITY: MENTAL HEALTH: CURRENT SMOKER: 0

## 2020-04-11 ASSESSMENT — ACTIVITIES OF DAILY LIVING (ADL)
ADLS_ACUITY_SCORE: 11

## 2020-04-11 ASSESSMENT — MIFFLIN-ST. JEOR: SCORE: 1661

## 2020-04-11 NOTE — PLAN OF CARE
A&OX4, Martiniquais speaking.VSS: stable. NPO from midnight. Up with Ax1, gbelt and walker, refusing to bear weight on LLE. CMS: intact. Weak pulses +1. Drsg:  Acewrap:CDI. Wound Vac in place. Pain managed with PRN Oxycodone and scheduled Tylenol. B, 181. I&D scheduled today. Nursing continue to monitor.

## 2020-04-11 NOTE — PROGRESS NOTES
Wound clean and easily closed  Plan discontinue if ok overnight (BS nl stable and no fever) and OK w ID  Antibiotics per ID

## 2020-04-11 NOTE — ANESTHESIA CARE TRANSFER NOTE
Patient: Kris Moeller Sr.    Procedure(s):  IRRIGATION AND DEBRIDEMENT, left leg with closure    Diagnosis: Wound infection [T14.8XXA, L08.9]  Diagnosis Additional Information: No value filed.    Anesthesia Type:   General     Note:  Airway :Face Mask  Patient transferred to:PACU  Handoff Report: Identifed the Patient, Identified the Reponsible Provider, Reviewed the pertinent medical history, Discussed the surgical course, Reviewed Intra-OP anesthesia mangement and issues during anesthesia, Set expectations for post-procedure period and Allowed opportunity for questions and acknowledgement of understanding      Vitals: (Last set prior to Anesthesia Care Transfer)    CRNA VITALS  4/11/2020 1022 - 4/11/2020 1056      4/11/2020             Pulse:  86    SpO2:  99 %                Electronically Signed By: TAVO Garcia CRNA  April 11, 2020  10:56 AM

## 2020-04-11 NOTE — OP NOTE
Procedure Date: 2020      PREOPERATIVE DIAGNOSIS:  Left thigh abscess.      POSTOPERATIVE DIAGNOSIS:  Left thigh abscess.      PROCEDURE PERFORMED:  Irrigation and debridement with closure, left thigh wound.      SURGEON:  Aubrey Daugherty MD      ASSISTANT:  Paige Gutierrez PA-C      ANESTHESIA:  General.      TOURNIQUET TIME:  None.      ESTIMATED BLOOD LOSS:  5 mL.      SPECIMENS:  None.      COMPLICATIONS:  None.      DESCRIPTION OF PROCEDURE:  The patient was taken to the operating room where after administration of general anesthetic, continued antibiotics, and sterile prep and drape, the wound VAC and sponge were removed.  The bed was clean.  There was no fluid and the tissue edges were easily approximated.  The wound was irrigated with 1 liter of saline and 2 liters of antibiotic solution followed by layered closure with 2-0 Vicryl and 3-0 nylon.  A sterile dressing was applied.  There were no complications.         AUBREY DAUGHERTY MD             D: 2020   T: 2020   MT: ANDREA      Name:     DANIELA JOSEPH   MRN:      [id number]        Account:        [id number]   :      1975           Procedure Date: 2020      Document: [id number]

## 2020-04-11 NOTE — ANESTHESIA PREPROCEDURE EVALUATION
Anesthesia Pre-Procedure Evaluation    Patient: Kris Moeller Sr.   MRN: [id number] : 1975          Preoperative Diagnosis: Wound infection [T14.8XXA, L08.9]    Procedure(s):  IRRIGATION AND DEBRIDEMENT, left leg with wound vac change versus closure  APPLICATION, WOUND VAC    Past Medical History:   Diagnosis Date     Diabetes mellitus (H)      Hyperlipidemia      Hypertension      TIA (transient ischemic attack)      Past Surgical History:   Procedure Laterality Date     INCISION AND DRAINAGE LOWER EXTREMITY, COMBINED Left 2020    Procedure: 1.  Irrigation and debridement phlegmon left posterolateral thigh involving skin, subcutaneous tissue and fascia through an incision measuring 6 x 6 cm in length.  2.  Placement of wound VAC.;  Surgeon: Claudy Wagner MD;  Location: [facility] OR     Anesthesia Evaluation     . Pt has had prior anesthetic. Type: General           ROS/MED HX    ENT/Pulmonary:  - neg pulmonary ROS     Neurologic:     (+)TIA     Cardiovascular:     (+) Dyslipidemia, hypertension----. : . . . :. .       METS/Exercise Tolerance:     Hematologic:     (+) Anemia, -      Musculoskeletal:   (+) arthritis,  -       GI/Hepatic:  - neg GI/hepatic ROS       Renal/Genitourinary:  - ROS Renal section negative       Endo:     (+) type II DM Obesity, .      Psychiatric:  - neg psychiatric ROS       Infectious Disease:   (+) Other Infectious Disease       Malignancy:         Other:                          Physical Exam  Normal systems: cardiovascular and pulmonary    Airway   Mallampati: II  TM distance: >3 FB  Neck ROM: full    Dental     Cardiovascular       Pulmonary             Lab Results   Component Value Date    WBC 12.5 (H) 2020    HGB 10.8 (L) 2020    HCT 34.6 (L) 2020     2020    .0 (H) 2020    SED 40 (H) 2020     2020    POTASSIUM 4.0 2020    CHLORIDE 111 (H) 2020    CO2 25 2020    BUN 20 2020    CR 0.92 2020  "   GLC 83 04/11/2020    KHALIDA 8.3 (L) 04/11/2020    PHOS 4.1 10/16/2010    MAG 1.9 10/16/2010    ISMAEL 38 01/12/2017    PTT 29 01/12/2017    INR 0.95 01/12/2017    TSH 2.26 01/12/2017       Preop Vitals  BP Readings from Last 3 Encounters:   04/11/20 132/73   10/03/17 125/75   01/13/17 118/65    Pulse Readings from Last 3 Encounters:   04/11/20 77   10/03/17 97   01/13/17 75      Resp Readings from Last 3 Encounters:   04/11/20 16   10/03/17 16   01/13/17 16    SpO2 Readings from Last 3 Encounters:   04/11/20 97%   10/03/17 97%   01/13/17 94%      Temp Readings from Last 1 Encounters:   04/11/20 98.3  F (36.8  C) (Temporal)    Ht Readings from Last 1 Encounters:   04/07/20 1.651 m (5' 5\")      Wt Readings from Last 1 Encounters:   04/11/20 85.4 kg (188 lb 4.8 oz)    Estimated body mass index is 31.33 kg/m  as calculated from the following:    Height as of this encounter: 1.651 m (5' 5\").    Weight as of this encounter: 85.4 kg (188 lb 4.8 oz).       Anesthesia Plan      History & Physical Review  History and physical reviewed and following examination; no interval change.    ASA Status:  3 .    NPO Status:  > 8 hours    Plan for General with Intravenous induction. Maintenance will be Balanced.    PONV prophylaxis:  Ondansetron (or other 5HT-3) and Dexamethasone or Solumedrol    The patient is not a current smoker      Postoperative Care  Postoperative pain management:  IV analgesics, Oral pain medications and Multi-modal analgesia.      Consents  Anesthetic plan, risks, benefits and alternatives discussed with:  Patient..                 Chun Kaur MD                    .  "

## 2020-04-11 NOTE — DISCHARGE INSTRUCTIONS
Your hospital follow up appointment has been schedule for you with Sheng Lynne at [facility] for Monday 4/20/20 at 3:00pm. Please bring your hospital discharge instructions, a photo ID, insurance information and any new medications with you to your appointment. Please call the clinic at [phone] if you need to reschedule.    [address] [place] [address]       Terapia nutricional para la diabetes tipo 2    Por qué es importante contar carbohidratos?     Contar las porciones de carbohidratos puede ayudarle a controlar la glucosa (azúcar) en la miladys para que se sienta mejor.     El equilibrio entre los carbohidratos que come y la insulina determina el nivel de glucosa que tendrá en la miladys después de comer.     El conteo de carbohidratos también puede ayudarle a planificar sandy comidas.      Qué alimentos contienen carbohidratos?   Entre los alimentos con carbohidratos se incluyen:     Panes, galletas saladas y cereales     Pastas, arroz y granos     Vegetales (verduras) con almidón, hailey joe, elote (maíz o choclo) y chícharos (guisantes o arvejas)     Frijoles (habichuelas) y legumbres     Leche, leche de soya y yogur     Frutas y jugos de frutas     Dulces hailey pasteles, galletas, helados, mermeladas y jaleas     Porciones de carbohidratos   Al planificar comidas para la diabetes, un alimento con 1 porción de carbohidratos contiene cerca de 15 gramos de carbohidratos:     Verifique el tamaño de las porciones con tazas y cucharas de medir o con leon pesa de alimentos.     Teresa los Datos de Nutrición en las etiquetas de los alimentos para saber cuántos gramos de carbohidratos contienen los alimentos que come.     Los alimentos en concepcion folleto muestran porciones que contienen cerca de 15 gramos de carbohidratos.Copyright Academy of Nutrition and Dietetics. This handout may be duplicated for client education. Type 2 Diabetes Nutrition Therapy ([place]an) - Page 2     Consejos para  planificar sandy comidas   Un Plan de Alimentación le indica cuántas porciones de carbohidratos consumir en sandy comidas y refrigerios (snacks). Para muchos adultos es adecuado comer 3 a 5 porciones de carbohidratos en cada comida y 1 a 2 porciones de carbohidratos en cada refrigerio.     En un Plan de Alimentación diaria saludable, la mayoría de los carbohidratos provienen de:   o Al menos 6 porciones de frutas y vegetales sin almidón   o Al menos 6 porciones de granos, frijoles y vegetales con almidón, de las cuales al menos 3 porciones deben ser granos integrales   o Al menos 2 porciones de leche o productos lácteos     Revise regularmente hudson nivel de glucosa en la miladys. [place] puede indicarle si necesita ajustar las horas a las que consume carbohidratos.     [place] alimentos que contienen fibra, hailey el ac integral, y comer muy pocos alimentos salados es mahajan para hudson giovany.     Coma 4 a 6 onzas de carne u otros alimentos con proteínas (hailey hamburguesas de soya) cada día. Elija monreal de proteínas bajas en grasa, tales hailey arthur magras de res y de cerdo, fabi, pescado, queso bajo en grasa o alimentos vegetarianos hailey la soya.     Coma algunas grasas saludables, tales hailey aceite de anne, de canola y nueces.     Coma muy pocas grasas saturadas. Estas grasas no son saludables y se encuentran en la mantequilla, la crema y en las arthur de alto contenido graso, tales hailey el tocino (tocineta) y las salchichas o chorizos.     Coma muy pocas o nada de grasas trans. Estas grasas no son saludables y se encuentran en todos los alimentos que contienen aceites  parcialmente hidrogenados  en hudson lista de ingredientes.     Consejos para leer etiquetas   En los Datos de Nutrición de las etiquetas aparece leon lista con el total de gramos de carbohidratos en 1 porción estándar. La porción estándar puede ser mayor o keerthi que 1 porción de carbohidratos. Para saber cuántas porciones de carbohidratos hay en un alimento:      Yovanny maryann el tamaño de la porción estándar de la etiqueta.     Luego verifique el total de gramos de carbohidratos. Esta es la cantidad de carbohidratos en leon porción estándar.     Divida el total de gramos de carbohidratos por 15. Sarina número equivale al número de porciones de carbohidratos en 1 porción estándar. Recuerde: 1 porción de carbohidratos equivale a 15 gramos de carbohidratos.     Nota: Puede ignorar los gramos de azúcar en los Datos de Nutrición, ya que están incluidos en el total de gramos de carbohidratos.   Copyright Academy of Nutrition and Dietetics. This handout may be duplicated for client education. Type 2 Diabetes Nutrition Therapy (Albanian) - Page 3     Listas de alimentos para el conteo de carbohidratos   1 porción = cerca de 15 gramos de carbohidratos   Granos     1 rebanada de pan (1 onza)     1 tortilla (6 pulgadas)       rosca de pan (bagel) candelaria (1 onza)     2 tortillas para taco (5 pulgadas)       pan para hamburguesa o para salchicha (hot dog) (  onza)       taza de cereal listo para comer, sin endulzar       taza de cereal cocido     1 taza de sopa a base de caldo     4-6 galletitas saladas     ? taza de pasta o arroz (cocidos)       taza de frijoles, chícharos, granos de elote, camotes (batatas, boniatos), calabaza (zapallo), puré de joe o joe hervidas (cocidos)       papa candelaria asada (3 onzas)       onza de pretzels, papitas o totopos (tortilla chips)     3 tazas de palomitas de maíz (popcorn) (ya preparadas)     Frutas     1 fruta fresca pequeña (  a 1 taza)       taza de fruta enlatada o congelada     17 uvas pequeñas (3 onzas)     1 taza de melón, bayas (moras)       taza de jugo de fruta sin endulzar     2 cucharadas de frutas secas (arándanos azules/blueberries, cerezas, arándanos rojos/ cranberrries, frutas surtidas, uvas pasas/pasitas)     Leche     1 taza de leche descremada o reducida en grasa     1 taza de leche de soya     ? taza (6 onzas) de yogur descremado  con edulcorante sin azúcar     Dulces y postres     pastel ashley de 2 pulgadas (sin betún/cobertura)     2 galletitas dulces (? onza)       taza de helado o yogur congelado       taza de sorbete (sherbet) o evaristo (sorbet)     1 cucharada de jarabe (sirope), mermelada, jalea, azúcar o miel     2 cucharadas de jarabe bajo en calorías   Copyright Academy of Nutrition and Dietetics. This handout may be duplicated for client education. Type 2 Diabetes Nutrition Therapy (British) - Page 4     Otros alimentos     Cuente 1 taza de vegetales crudos o   taza de vegetales cocidos, sin almidón, hailey porciones de alimentos con cero (0) carbohidratos o  sin restricción.  Si come 3 o más porciones en leon comida, cuéntelas hailey 1 porción de carbohidratos.     Los alimentos que contienen menos de 20 calorías en cada porción también pueden contarse hailey porciones con cero carbohidratos o alimentos  sin restricción.      Cuente 1 taza de guiso (estofado) u otros alimentos mezclados hailey 2 porciones de carbohidratos.     Notas: Copyright Academy of Nutrition and Dietetics. This handout may be duplicated for client education. Type 2 Diabetes Nutrition Therapy (British) - Page 5     Diabetes tipo 2: Ejemplo de menú para 1 día Desayuno    toronja/pomelo (1 porción de carbohidratos)     taza de cereal de salvado (1 porción de carbohidratos)   1 taza de leche descremada (1 porción de carbohidratos)   1 tostada de pan de ac integral (1 porción de carbohidratos)   1 cucharadita de margarina    Almuerzo  2 onzas de pechuga de pavo   2 rebanadas de pan de flores (2 porciones de carbohidratos)   1 cucharadita de margarina   1 taza de sopa a base de caldo (1 porción de carbohidratos)   Ensalada de tiffany y tomate   1 cucharada de aderezo para ensaladas reducido en calorías (light)   6 onzas de yogur artificialmente endulzado (1 porción de carbohidratos)   1 diane de refresco de dieta    Marisol  3 onzas de fabi asado   3 onzas de joe (1  porción de carbohidratos)     taza de ejotes/habichuelas tiernas/chauchas   2 cucharaditas de margarina   Ensalada mixta   1 cucharada de aderezo para ensaladas reducido en calorías (light )   1 taza de leche descremada (1 porción de carbohidratos)   1 manzana pequeña (1 porción de carbohidratos)    Refrigerio (snack) nocturno  1 cucharada de nueces     taza de helado (1 porción de carbohidratos)   1  taza de fresas/frutillas (1 porción de carbohidratos)

## 2020-04-11 NOTE — PROVIDER NOTIFICATION
"Paged MD regarding pt BG of 78, asked if could give prn IV dextrose d/t pt being NPO and feeling very \"bad\" d/t hypoglycemia despite pt not meeting criteria of BG <70. Will await response.  "

## 2020-04-11 NOTE — PLAN OF CARE
Discharge Planner PT   Patient plan for discharge: Home  Current status: Pt now status post WOC vac removal and wound closure.Pt agreeable to working with PT. Pt was cued for supine to sit, inde. Pt was cued for sit to stand with FWW. Pt was CGA/SBA with this. Pt was cued for ambuation with FWW, cues for proper wt bearing. Pt was cued for heel strike and improved progression as tolerated. Pt improving wt bearing. Pt was cued for 4 stairs x 2 with B railings. Pt was CGA with this. Pt ambulation pattern is still variable and limited so could benefit from continued PT at this time.    Barriers to return to prior living situation: Decreased activity tolerance, impaired gait pattern  Recommendations for discharge: Home with HHPT and assist for stair management  Rationale for recommendations: Anticipate that with continued IP PT the pt will be able to complete all basic mobility skills required for safe discharge home. Rec continued PT after discharge to ensure pt returns to normalized gait pattern, therefore reducing likelihood of further injury to L LE. HHPT indicated as leaving the home would be taxing on the pt.        Entered by: Cathleen Hernández 04/11/2020 3:29 PM

## 2020-04-11 NOTE — PLAN OF CARE
Pt arrived back on floor at 1215 from PACU. Alert and oriented. Swedish speaking, utilizing  phone and iPad to communicate. Tolerating full liquid diet, monitoring BG levels. Transfers with Ax1, gait belt, and walker. Dressing CDI. Voiding in adequate amounts. Pain managed with scheduled Tylenol. On IV Ancef. Plans d/c to home tomorrow.

## 2020-04-11 NOTE — PROGRESS NOTES
"  [facility]  Hospitalist Progress Note  Willam Martinez MD 04/11/2020    Reason for Stay (Diagnosis): thigh abscess         Assessment and Plan:      Summary of Stay: Kris Moeller Sr. is a 45 year old male with history of poorly controlled IDDM type II and HLD who was admitted on 4/7/2020 with left lateral upper leg pain, erythema, swelling, and drainage from a pustule of his skin.  MRI of the left thigh did not show any discrete abscess, but did show intramuscular edema.  Pt underwent I&D 4/9 that showed a phlegmon involving the subcutaneous tissue; the wound was left open and wound VAC placed.  pt returned to the OR on 4/11 for wound closure.  Abscess cx are positive for MSSA    Plans today:  - stop vanco  - stop Rocephin  - start IV Ancef  - likely home tomorrow   - decrease Levemir dose to 35 units at bedtime given low BS this AM    Problem List/Assessment and Plan:   Left lateral thigh cellulitis, phlegmon: see discussion above.  Wound closed on 4/11; cx pos for MSSA.       Poorly controlled IDDM type II: PTA on Levemir 45 units at bedtime along with aspart 15 units twice daily with meals and metformin 1000 mg twice daily with meals.  Also takes 81 mg aspirin.  Poorly controlled in the past.  A1c is 12.3.  Pt with decrease in BS this AM        DVT Prophylaxis: Pneumatic Compression Devices  Code Status: Full Code  Discharge Dispo: Home  Estimated Disch Date / # of Days until Disch: tomorrow      Interval History (Subjective):      No complaints.  Pain controlled.  Had surgery earlier this AM                  Physical Exam:      Last Vital Signs:  BP (!) 161/85   Pulse 69   Temp 97.4  F (36.3  C) (Temporal)   Resp 14   Ht 1.651 m (5' 5\")   Wt 85.4 kg (188 lb 4.8 oz)   SpO2 96%   BMI 31.33 kg/m        Intake/Output Summary (Last 24 hours) at 4/11/2020 1616  Last data filed at 4/11/2020 1500  Gross per 24 hour   Intake 1380 ml   Output 1885 ml   Net -505 ml       Constitutional: Awake, " alert, cooperative, no apparent distress   Respiratory: Clear to auscultation bilaterally, no crackles or wheezing   Cardiovascular: Regular rate and rhythm, normal S1 and S2, and no murmur noted   Abdomen: Normal bowel sounds, soft, non-distended, non-tender   Skin: No rashes, no cyanosis, dry to touch   Neuro: Alert and oriented x3, no weakness, numbness, memory loss   Extremities: No edema, normal range of motion.  LE wrapped in ACE bandage   Other(s):        All other systems: Negative          Medications:      All current medications were reviewed with changes reflected in problem list.         Data:      All new lab and imaging data was reviewed.   Labs:  Recent Labs   Lab 04/11/20  0619 04/10/20  0614 04/09/20  0620   WBC 12.5* 16.1* 17.0*   HGB 10.8* 11.1* 11.8*   HCT 34.6* 34.0* 36.6*   MCV 92 88 87    304 312      Imaging:   No results found for this or any previous visit (from the past 24 hour(s)).

## 2020-04-11 NOTE — PROGRESS NOTES
" INFECTIOUS DISEASE Progress Note  April 11, 2020  [id number]  Kris Moeller Sr.    ANTIBIOTICS:  Cefazolin 2 g iv q 8    45-year-old male with subacute worsening left knee area abscess, mild systemic sepsis with cultures pending.     Prior history of some recurrent skin lesions treated with Bactrim in the past, but no history of MRSA.    Poorly controlled diabetes mellitus.     SUBJECTIVE: afebrile, notes reviewed, wound clean and closed, likely discharge tomorrow    OBJECTIVE:  BP (!) 149/85   Pulse 69   Temp 96.6  F (35.9  C) (Temporal)   Resp 14   Ht 1.651 m (5' 5\")   Wt 85.4 kg (188 lb 4.8 oz)   SpO2 98%   BMI 31.33 kg/m        LAB Data:  WBC 12 from 16    MICROBIOLOGY:  cxs thigh MSSA 4/9  BC x 2 NG    MRI L knee-  FINDINGS:   Osseous Structures:  No cortical erosion, periosteal reaction,  intramedullary edema or enhancement to suggest osteomyelitis.      Soft Tissue: There is a shallow cutaneous ulceration in the lateral  aspect of the distal thigh. Deep to the cutaneous ulceration there is  an ill-defined area of soft tissue thickening without central fluid  that measures 4.0 cm CC by 3.0 cm AP and 1.5 cm in RL dimension  consistent with inflamed/infected phlegmon. Moderate diffuse  subcutaneous edema and enhancement in the lateral, anterolateral and  posterior lateral soft tissues.     Muscle/Tendons: There is mild edema and enhancement in the distal  lateral aspect of the vastus lateralis and shortening of the biceps  musculature which could be reactive or due to deep intramuscular  infection. No focal intramuscular abscess.     Trochanteric and Iliopsoas Bursae: No fluid collection in the  trochanteric and iliopsoas bursae.     Joint space: No evidence for septic arthritis. Moderate size knee  joint effusion.                                                                       IMPRESSION:   1. Focal area of shallow ulceration and deep soft tissue  infection/inflammation without discrete abscess in the " lateral aspect  of the distal thigh.  2. Intramuscular edema in the vastus lateralis and short biceps which  could be reactive or due to deep infection.  3. No septic arthritis or osteoarthritis.  4. Knee joint effusion.     x  Attestation:  I have reviewed today's vital signs, notes, medications, labs and imaging.    ASSESSMENT:  1. THIGH [id number]-la cefazolin; BC NG, oral cephalexin ok on discharge; wound clean/closed  2.  LEUKOCYTOSIS-improving    RECOMMENDATION:   1. Cont cefazolin  2. On discharge cephalexin 500 mg qid x 14 days  I  Will f/u prn tomorrow  RADHA MURDOCK M.D.  O:[phone]   B:[phone]

## 2020-04-11 NOTE — CONSULTS
CLINICAL NUTRITION SERVICES  -  ASSESSMENT NOTE    Recommendations Ordered by Registered Dietitian (RD):   Diet advancement per MD   Malnutrition:   % Weight Loss: unable to assess, limited weight history   % Intake: unable to assess, no decreased intake noted during admission   Subcutaneous Fat Loss: unable to assess  Muscle Loss:  unable to assess  Fluid Retention:  None documented     Malnutrition Diagnosis: Unable to determine due to lack of nutrition focused physical exam, weight history and nutrition history per direction of new department policy in the setting of COVID19      REASON FOR ASSESSMENT  Kris Moeller Sr. is a 45 year old male seen by Registered Dietitian for RN Consult - A1C 12.3 diabetic diet education     NUTRITION HISTORY  - Information obtained from chart. Of note, pt is Nigerien speaking and requires and . Pt also in OR currently undergoing I&D. RN busy in pt room.   - Pt admitted for cellulitis and leukocytosis   - History of type 2 diabetes mellitus, tobacco abuse, hypertension, hyperlipidemia  - Unable to obtain nutrition history from patient, see above.   - Allergies: NKFA     ADDENDUM: RN believes all interpreters are off-site at the moment. Logistically would be difficult to use ipad or video to interpret over phone, placed handout instructions in Nigerien in the discharge orders.     CURRENT NUTRITION ORDERS  Diet Order:     NPO --> plans to undergo an I&D    4/7-4/8: Moderate Consistent CHO   4/8: NPO   4/8-4/9: Moderate Consistent CHO   4/9: NPO   4/9: Clear Liquid  4/9-4/11: Regular/1552-6016 calories/Moderate Consistent CHO   4/11: NPO for procedure     Current Intake/Tolerance:  Per the flowsheets, pt is consuming % of meals. Ordering meals consistently TID when diet order allows.     NUTRITION FOCUSED PHYSICAL ASSESSMENT FOR DIAGNOSING MALNUTRITION)  No:  per direction of new department policy in the setting of COVID19             Observed:    Deferred d/t above  "    Obtained from Chart/Interdisciplinary Team:  - Last BM not recorded   - Pt underwent I&D on 4/9  - Repeat I&D scheduled for 4/11    ANTHROPOMETRICS  Height: 5' 5\"  Weight: 85.4 kg ( 188 lbs 4.8 oz)   Body mass index is 31.33 kg/m .  Weight Status:  Obesity Grade I BMI 30-34.9  Weight History: very limited weight history to comment on. Unable to obtain weight history from pt.   Wt Readings from Last 10 Encounters:   04/11/20 85.4 kg (188 lb 4.8 oz)   10/03/17 85.3 kg (188 lb)   01/12/17 81.6 kg (180 lb)     LABS  Labs reviewed    Electrolytes  Potassium (mmol/L)   Date Value   04/11/2020 4.0   04/09/2020 3.8   04/08/2020 4.4     Phosphorus (mg/dL)   Date Value   10/16/2010 4.1    Blood Glucose  Glucose (mg/dL)   Date Value   04/11/2020 83   04/10/2020 191 (H)   04/09/2020 154 (H)   04/08/2020 220 (H)   04/07/2020 295 (H)     Hemoglobin A1C (%)   Date Value   04/08/2020 12.3 (H)   01/12/2017 12.0 (H)   10/11/2010 9.4 (H)    Inflammatory Markers  CRP Inflammation (mg/L)   Date Value   04/07/2020 110.0 (H)     WBC (10e9/L)   Date Value   04/11/2020 12.5 (H)   04/10/2020 16.1 (H)   04/09/2020 17.0 (H)      Magnesium (mg/dL)   Date Value   10/16/2010 1.9     Sodium (mmol/L)   Date Value   04/11/2020 140   04/09/2020 135   04/08/2020 134    Renal  Urea Nitrogen (mg/dL)   Date Value   04/11/2020 20   04/09/2020 19   04/08/2020 21     Creatinine (mg/dL)   Date Value   04/11/2020 0.92   04/10/2020 0.82   04/09/2020 1.00     Additional  Triglycerides (mg/dL)   Date Value   01/13/2017 244 (H)   10/12/2010 155 (H)     Ketones Urine (mg/dL)   Date Value   01/12/2017 Negative        MEDICATIONS  Medications reviewed      acetaminophen  975 mg Oral Q8H     aspirin  325 mg Oral Daily     cefTRIAXone  1 g Intravenous Q24H     gabapentin  300 mg Oral BID     insulin aspart   Subcutaneous TID w/meals     insulin aspart  1-7 Units Subcutaneous TID AC     insulin aspart  1-5 Units Subcutaneous At Bedtime     insulin detemir  40 Units " Subcutaneous At Bedtime     senna-docusate  1 tablet Oral BID    Or     senna-docusate  2 tablet Oral BID     sodium chloride (PF)  3 mL Intracatheter Q8H     sodium chloride (PF)  3 mL Intracatheter Q8H     vancomycin (VANCOCIN) IV  1,750 mg Intravenous Q12H        sodium chloride 10 mL/hr at 04/09/20 1225      [START ON 4/12/2020] acetaminophen, glucose **OR** dextrose **OR** glucagon, hydrOXYzine, lidocaine 4%, lidocaine 4%, lidocaine (buffered or not buffered), lidocaine (buffered or not buffered), melatonin, naloxone, naproxen, ondansetron **OR** ondansetron, oxyCODONE, oxyCODONE, polyethylene glycol, prochlorperazine **OR** prochlorperazine **OR** prochlorperazine, sodium chloride (PF), sodium chloride (PF)     ASSESSED NUTRITION NEEDS PER APPROVED PRACTICE GUIDELINES:    Dosing Weight 85.4 kg   Estimated Energy Needs: 7869-3342 kcals (20-25 Kcal/Kg)  Justification: maintenance  Estimated Protein Needs: + grams protein (1-1.2+ g pro/Kg)  Justification: maintenance, undergoing I&D  Estimated Fluid Needs: per MD    MALNUTRITION:  % Weight Loss: unable to assess, limited weight history   % Intake: unable to assess, no decreased intake noted during admission   Subcutaneous Fat Loss: unable to assess  Muscle Loss:  unable to assess  Fluid Retention:  None documented     Malnutrition Diagnosis: Unable to determine due to lack of nutrition focused physical exam, weight history and nutrition history per direction of new department policy in the setting of COVID19    NUTRITION DIAGNOSIS:  Altered nutrition-related lab value related to poorly controlled diabetes as evidenced by pt with an A1C of 12.3     NUTRITION INTERVENTIONS  Recommendations / Nutrition Prescription  Diet advancement per MD    Implementation  Nutrition education: deferred, all handouts placed in discharge instructions. Pt requiring ipad , RN unavailable.     Nutrition Goals  Pt to consume >/=75% of meals ordered TID     MONITORING  AND EVALUATION:  Progress towards goals will be monitored and evaluated per protocol and Practice Guidelines    Milli Paulson RD, LD  Clinical Dietitian

## 2020-04-12 ENCOUNTER — APPOINTMENT (OUTPATIENT)
Dept: PHYSICAL THERAPY | Facility: CLINIC | Age: 45
End: 2020-04-12
Payer: MEDICAID

## 2020-04-12 ENCOUNTER — APPOINTMENT (OUTPATIENT)
Dept: OCCUPATIONAL THERAPY | Facility: CLINIC | Age: 45
End: 2020-04-12
Payer: MEDICAID

## 2020-04-12 VITALS
RESPIRATION RATE: 16 BRPM | SYSTOLIC BLOOD PRESSURE: 141 MMHG | HEIGHT: 65 IN | HEART RATE: 82 BPM | WEIGHT: 188.3 LBS | BODY MASS INDEX: 31.37 KG/M2 | DIASTOLIC BLOOD PRESSURE: 74 MMHG | TEMPERATURE: 96.8 F | OXYGEN SATURATION: 94 %

## 2020-04-12 LAB
CREAT SERPL-MCNC: 0.73 MG/DL (ref 0.66–1.25)
GFR SERPL CREATININE-BSD FRML MDRD: >90 ML/MIN/{1.73_M2}
GLUCOSE BLDC GLUCOMTR-MCNC: 238 MG/DL (ref 70–99)
GLUCOSE SERPL-MCNC: 161 MG/DL (ref 70–99)

## 2020-04-12 PROCEDURE — 97535 SELF CARE MNGMENT TRAINING: CPT | Mod: GO | Performed by: REHABILITATION PRACTITIONER

## 2020-04-12 PROCEDURE — 36415 COLL VENOUS BLD VENIPUNCTURE: CPT | Performed by: STUDENT IN AN ORGANIZED HEALTH CARE EDUCATION/TRAINING PROGRAM

## 2020-04-12 PROCEDURE — 25000132 ZZH RX MED GY IP 250 OP 250 PS 637: Performed by: ORTHOPAEDIC SURGERY

## 2020-04-12 PROCEDURE — 82565 ASSAY OF CREATININE: CPT | Performed by: STUDENT IN AN ORGANIZED HEALTH CARE EDUCATION/TRAINING PROGRAM

## 2020-04-12 PROCEDURE — 25000128 H RX IP 250 OP 636: Performed by: INTERNAL MEDICINE

## 2020-04-12 PROCEDURE — 97530 THERAPEUTIC ACTIVITIES: CPT | Mod: GP | Performed by: PHYSICAL THERAPIST

## 2020-04-12 PROCEDURE — 00000146 ZZHCL STATISTIC GLUCOSE BY METER IP

## 2020-04-12 PROCEDURE — 99207 ZZC CDG-CODE CATEGORY CHANGED: CPT | Performed by: INTERNAL MEDICINE

## 2020-04-12 PROCEDURE — 99231 SBSQ HOSP IP/OBS SF/LOW 25: CPT | Performed by: INTERNAL MEDICINE

## 2020-04-12 PROCEDURE — 97116 GAIT TRAINING THERAPY: CPT | Mod: GP | Performed by: PHYSICAL THERAPIST

## 2020-04-12 PROCEDURE — 82947 ASSAY GLUCOSE BLOOD QUANT: CPT | Performed by: STUDENT IN AN ORGANIZED HEALTH CARE EDUCATION/TRAINING PROGRAM

## 2020-04-12 RX ORDER — AMOXICILLIN 250 MG
2 CAPSULE ORAL 2 TIMES DAILY PRN
Qty: 30 TABLET | Refills: 0 | Status: SHIPPED | OUTPATIENT
Start: 2020-04-12

## 2020-04-12 RX ORDER — OXYCODONE HYDROCHLORIDE 5 MG/1
TABLET ORAL
Qty: 20 TABLET | Refills: 0 | Status: SHIPPED | OUTPATIENT
Start: 2020-04-12

## 2020-04-12 RX ORDER — INSULIN ASPART 100 [IU]/ML
10 INJECTION, SOLUTION INTRAVENOUS; SUBCUTANEOUS 2 TIMES DAILY WITH MEALS
Start: 2020-04-12

## 2020-04-12 RX ORDER — CEPHALEXIN 500 MG/1
500 CAPSULE ORAL 4 TIMES DAILY
Qty: 56 CAPSULE | Refills: 0 | Status: SHIPPED | OUTPATIENT
Start: 2020-04-12 | End: 2020-04-26

## 2020-04-12 RX ADMIN — ACETAMINOPHEN 975 MG: 325 TABLET, FILM COATED ORAL at 06:04

## 2020-04-12 RX ADMIN — INSULIN ASPART 1 UNITS: 100 INJECTION, SOLUTION INTRAVENOUS; SUBCUTANEOUS at 07:54

## 2020-04-12 RX ADMIN — ASPIRIN 325 MG: 325 TABLET, COATED ORAL at 08:32

## 2020-04-12 RX ADMIN — CEFAZOLIN SODIUM 2 G: 2 INJECTION, SOLUTION INTRAVENOUS at 04:51

## 2020-04-12 RX ADMIN — CELECOXIB 200 MG: 200 CAPSULE ORAL at 08:32

## 2020-04-12 ASSESSMENT — ACTIVITIES OF DAILY LIVING (ADL)
ADLS_ACUITY_SCORE: 11

## 2020-04-12 ASSESSMENT — MIFFLIN-ST. JEOR: SCORE: 1661

## 2020-04-12 NOTE — PLAN OF CARE
Pt is A&Ox4. On room air with capnography in place. Pain managed with PO Tylenol and Celebrex. Denies numbness and tingling. ACE wrap CDI to LLE. Foot is cool with +1 dorsalis pedis pulse. Leg remains elevated and patient encouraged to move foot around. Ambulating A-1 with walker and gait belt. Voiding spontaneously in good amounts. Tolerating a regular diet; carb counting completed by nurse. Blood sugars monitored. Plan to discharge home. Plan of care reviewed with patient. Remote  ([id number]) used for assessment and education.

## 2020-04-12 NOTE — PLAN OF CARE
VSS: stable, on Capo RA. Up with We5slrdw and walker. CMS: intact. Drsg: Compression bandage: CDI. Left extremity WBAT. Minimal pain with ambulation.  IVF infusing. Voiding adequate amounts. Passing flatus. Possible discharge to home today.

## 2020-04-12 NOTE — DISCHARGE SUMMARY
Admit Date:     04/07/2020   Discharge Date:     04/12/2020      PRINCIPAL FINAL DIAGNOSES:  Sepsis secondary to MSSA from left lateral thigh cellulitis with phlegmon/abscess, status post operative irrigation and debridement this admission with placement of wound VAC.  The patient had followup surgery prior to discharge with closure of wound.  Cultures grew out methicillin-sensitive Staphylococcus aureus.      PAST MEDICAL HISTORY:  History of diabetes mellitus type 2, poorly controlled with A1c of 12.      PRINCIPAL PROCEDURES THIS ADMISSION:   1.  Orthopedics consultation.   2.  Surgery as noted above.   3.  Left knee x-ray.   4.  MRI of the left knee.   5.  Infectious Disease consultation.   6.  Blood cultures that are without growth.   7.  Wound cultures growing out methicillin-sensitive Staphylococcus aureus.      REASON FOR ADMISSION:  Please see dictated history and physical.  In brief, Mr. Moeller is a 45-year-old male with the above medical history who presented to the hospital with concerns about left thigh pain and redness.  The patient was having discharge and drainage from a pustule of his skin.  He was admitted to the Hospitalist Service.      HOSPITAL COURSE:   1.  Left thigh cellulitis with phlegmon/abscess:  The patient was started on IV antibiotics for cellulitis.  He was seen by Orthopedic Surgery.  MRI showed a possible phlegmon.  The patient was taken to the operating room and abscess was drained.  A wound VAC was placed.  He returned to the operating room 2 days later for closure of the wound.  The patient was discharged to home on 04/12/2020 with oral antibiotic therapy for another 14 days.  Cultures did grow out MSSA.  Wound VAC not required on discharge from the hospital.      The patient did not appear septic or toxic while hospitalized.      DISCHARGE MEDICATIONS:   1.  Aspirin 81 mg daily.   2.  Keflex 500 mg 4 times a day for 14 more days.   3.  Levemir insulin 40 units subcutaneous at  bedtime.   4.  Metformin 1000 mg twice a day.   5.  NovoLog 10 units subcutaneous twice a day with meals.   6.  Oxycodone 5 mg every 4-6 hours as needed for pain.   7.  Senokot.      DISCHARGE INSTRUCTIONS:   1.  Follow up with Amy, physician's assistant with Dr. Ladarius blanco for wound check in 10-14 days.  The patient was given phone number to make an appointment.   2.  Your insulin dose has been reduced slightly.  This may need to be adjusted further depending on your blood sugar results at home.  Recommended check and record your blood sugar 2-3 times a day.  If blood sugars are difficult to manage, contact your primary MD further insulin adjustment if needed.      I examined the patient on day of discharge.         DONTE HORN MD             D: 2020   T: 2020   MT: HOMA      Name:     DANIELA JOSEPH   MRN:      [id number]        Account:        [id number]   :      1975           Admit Date:     2020                                  Discharge Date: 2020      Document: [id number]       cc: [facility]

## 2020-04-12 NOTE — PROGRESS NOTES
Pt seen and examined today with nurse in room and ipad interpreting services.  Feels well.  No concerns.  No pain.  Anticipate discharge home today after seen by orthopedic surgery.      F/u and wound care per orthopedics    Home on keflex x 14 days.

## 2020-04-12 NOTE — PROGRESS NOTES
Transition Communication Hand-off for Care Transitions to Next Level of Care Provider    Name: Kris Moeller SrKinaa  : 1975  MRN #: [id number]  Primary Care Provider: [facility]  Primary Care MD Name: Sheng Lynne  Primary Clinic: [address] [place] [address]  [facility] Care [facility] Name: [facility]   Reason for Hospitalization:  Cellulitis of left thigh [L03.116]  Fever in adult [R50.9]  Abscess of left thigh [L02.416]  Admit Date/Time: 2020  1:08 PM  Discharge Date: 20  Payor Source: No coverage found.      Readmission Assessment Measure (KORTNEY) Risk Score/category:          Reason for Communication Hand-off Referral: Other uncontrolled DM    Discharge Plan:       Concern for non-adherence with plan of care:   Yes  Discharge Needs Assessment:  Needs      Most Recent Value   Equipment Currently Used at Home  none   # of Referrals Placed by CTS  Scheduled Follow-up appointments          Already enrolled in Tele-monitoring program and name of program:  NA  Follow-up specialty is recommended: No    Follow-up plan:  No future appointments.    Any outstanding tests or procedures:            Supplies     Future Labs/Procedures    Walker     Process Instructions:    By signing this order, the Authorizing Provider is attesting that they have completed a face-to-face evaluation on the patient to determine their need for this equipment in the last 60 days. A new face-to-face evaluation is required each time  A new prescription for on eo f the specified items is ordered.   If an additional provider completed the evaluation, please indicate their name below.     **As of 2018, an order requisition and face sheet will print for all DME orders. Please give printed order and face sheet to patient if not obtaining product from [facility] DME closet.     Comments:    DME Documentation:   Describe the reason for need to support medical necessity: decreased tolerance to wt bearing.     I,  the undersigned, certify that the above prescribed supplies are medically necessary for this patient and is both reasonable and necessary in reference to accepted standards of medical and necessary in reference to accepted standards of medical practice in the treatment of this patient's condition and is not prescribed as a convenience.          Key Recommendations: pt admitted with left leg abcess and A1C of 12.3. Pt states he checks his blood sugars 2 times a day but follows a regular diet. He has not been to the doctor and a couple years. Admits to needing diabetic diet recommendations and agreeable to follow up with his PCP. Pt states he has testing supplies and insulin at home. CM attached diabetic diet resources to AVS in Lao per pts request and consulted Nutrition for diabetic meal management.     Pt need close follow up with diabetic diet compliance and importance to follow up with PCP.     Triny Yeung RN/ sent by LYLE Harrison RN, CM     AVS/Discharge Summary is the source of truth; this is a helpful guide for improved communication of patient story

## 2020-04-12 NOTE — PLAN OF CARE
Discharge Planner OT   Patient plan for discharge: home  Current status: SBA for bed mobility, donning sock on L LE, SBA, fww for sit<>stand and functional mobility in room and fall to ambulate to from satellite. After instruction, patient was SBA for walk in shower transfer and toilet transfer to standard height toilet. Patient educated in AE recommendations of shower chair and how to obtain in community. Patient denied further questions, will discharge from OT services.   Barriers to return to prior living situation: none from OT standpoint  Recommendations for discharge: home with family to A with IADL's- housekeeping, errands. Would benefit from shower chair  Rationale for recommendations: patient has met needed goals for safe return home with family A for IADLs.        Entered by: Unique Vincent 04/12/2020 9:32 AM     Occupational Therapy Discharge Summary    Reason for therapy discharge:    All goals and outcomes met, no further needs identified.    Progress towards therapy goal(s). See goals on Care Plan in [facility] electronic health record for goal details.  Goals met    Therapy recommendation(s):    No further therapy is recommended.

## 2020-04-12 NOTE — PLAN OF CARE
Discharge Planner PT   Patient plan for discharge: Home with wife and HEP  Current status: Pt was cued for use of FWW and improved gait pattern, 200 feet tolerating well. Pt was cued for 4 stairs x 2 with B railings. Mod I with both. PT ordered walker for home. Pt cued for heel slides and QS, printed in Turkmen.   Barriers to return to prior living situation: Decreased activity tolerance, impaired gait pattern  Recommendations for discharge: Home with HHPT  Rationale for recommendations: Anticipate that with continued IP PT the pt will be able to complete all basic mobility skills required for safe discharge home. Rec continued PT after discharge to ensure pt returns to normalized gait pattern, therefore reducing likelihood of further injury to L LE. HHPT indicated as leaving the home would be taxing on the pt.        Entered by: Cathleen Hernández 04/12/2020 8:45 AM     Physical Therapy Discharge Summary    Reason for therapy discharge:    Discharged to home with home therapy.    Progress towards therapy goal(s). See goals on Care Plan in [facility] electronic health record for goal details.  Goals met    Therapy recommendation(s):    Continued therapy is recommended.  Rationale/Recommendations: Pt is below baseline for functional mobility, ROM and strength. Pt would benefit from continued skilled therapy to address these deficits.

## 2020-04-12 NOTE — PROGRESS NOTES
Discharge Planner   Discharge Plans in progress: Per chart review, pt is discharge ready today. PT is recommending home PT at discharge. Pt does not have insurance coverage, discussed with Cathleen PT, she feels as though pt has made good progress and would be safe to discharge to home with the support of his family. She has already provided pt with education and handouts for home exercise to improve mobility. Pt has already been given WW for home as well.     Met w/ pt at bedside via iPad  and discussed discharge plan, pt expresses understanding and agrees that he will be safe to discharge to home w/ WW and family support.     He would like his medications filled at our pharmacy at so they can be sent with him upon discharge. He denies any other discharge needs at this time.     Updated bedside RN, she will make sure pt has supply of all of his PTA medications as well and will request refills from Dr. Martinez, if needed to ensure has all medications he needs for home.     Barriers to discharge plan: None  Follow up plan: Not needed       Entered by: Miriam Harrison 04/12/2020 10:29 AM       CM will continue to follow patient for any additional discharge needs.     Miriam Harrison RN BSN   Inpatient Care Coordination  [facility]   Phone [phone]

## 2020-04-13 LAB
BACTERIA SPEC CULT: NO GROWTH
BACTERIA SPEC CULT: NO GROWTH
Lab: NORMAL
SPECIMEN SOURCE: NORMAL
SPECIMEN SOURCE: NORMAL

## 2020-04-14 LAB
BACTERIA SPEC CULT: ABNORMAL
Lab: ABNORMAL
SPECIMEN SOURCE: ABNORMAL

## 2020-04-15 NOTE — OP NOTE
Procedure Date: 2020      PREOPERATIVE DIAGNOSIS:  Left thigh abscess.      POSTOPERATIVE DIAGNOSIS:  Left thigh abscess.      PROCEDURE PERFORMED:  Irrigation and debridement with closure, left thigh wound.      SURGEON:  Aubrey Daugherty MD      ASSISTANT:  Paige Gutierrez PA-C      ANESTHESIA:  General.      TOURNIQUET TIME:  None.      ESTIMATED BLOOD LOSS:  5 mL.      SPECIMENS:  None.      COMPLICATIONS:  None.      DESCRIPTION OF PROCEDURE:  The patient was taken to the operating room where after administration of general anesthetic, continued antibiotics, and sterile prep and drape, the wound VAC and sponge were removed.  The bed was clean.  There was no fluid and the tissue edges were easily approximated.  The wound was irrigated with 1 liter of saline and 2 liters of antibiotic solution followed by layered closure with 2-0 Vicryl and 3-0 nylon.  A sterile dressing was applied.  There were no complications.      The irrigation and debridement included the skin and subcutaneous tissue.  This did not involve deeper layers.      Addendum # [id number]  added lg 4/15/20            AUBREY DAUGHERTY MD             D: 2020   T: 2020   MT: ANDREA      Name:     DANIELA JOSEPH   MRN:      [id number]        Account:        [id number]   :      1975           Procedure Date: 2020      Document: [id number]

## 2020-04-16 LAB
BACTERIA SPEC CULT: NORMAL
Lab: NORMAL
SPECIMEN SOURCE: NORMAL

## 2020-05-25 ENCOUNTER — APPOINTMENT (OUTPATIENT)
Dept: GENERAL RADIOLOGY | Facility: CLINIC | Age: 45
End: 2020-05-25
Attending: EMERGENCY MEDICINE
Payer: MEDICAID

## 2020-05-25 ENCOUNTER — HOSPITAL ENCOUNTER (EMERGENCY)
Facility: CLINIC | Age: 45
Discharge: HOME | End: 2020-05-25
Attending: EMERGENCY MEDICINE | Admitting: EMERGENCY MEDICINE
Payer: MEDICAID

## 2020-05-25 VITALS
OXYGEN SATURATION: 98 % | RESPIRATION RATE: 29 BRPM | SYSTOLIC BLOOD PRESSURE: 138 MMHG | TEMPERATURE: 99.2 F | HEART RATE: 107 BPM | DIASTOLIC BLOOD PRESSURE: 76 MMHG

## 2020-05-25 DIAGNOSIS — R50.9 FEVER OF UNKNOWN ORIGIN: ICD-10-CM

## 2020-05-25 DIAGNOSIS — E10.65 TYPE 1 DIABETES MELLITUS WITH HYPERGLYCEMIA (H): ICD-10-CM

## 2020-05-25 LAB
ALBUMIN SERPL-MCNC: 3.5 G/DL (ref 3.4–5)
ALBUMIN UR-MCNC: 70 MG/DL
ALP SERPL-CCNC: 113 U/L (ref 40–150)
ALT SERPL W P-5'-P-CCNC: 23 U/L (ref 0–70)
ANION GAP SERPL CALCULATED.3IONS-SCNC: 5 MMOL/L (ref 3–14)
APPEARANCE UR: CLEAR
AST SERPL W P-5'-P-CCNC: 13 U/L (ref 0–45)
BASE EXCESS BLDV CALC-SCNC: 0.1 MMOL/L
BASOPHILS # BLD AUTO: 0 10E9/L (ref 0–0.2)
BASOPHILS NFR BLD AUTO: 0.2 %
BILIRUB SERPL-MCNC: 0.5 MG/DL (ref 0.2–1.3)
BILIRUB UR QL STRIP: NEGATIVE
BUN SERPL-MCNC: 21 MG/DL (ref 7–30)
CALCIUM SERPL-MCNC: 8.9 MG/DL (ref 8.5–10.1)
CHLORIDE SERPL-SCNC: 102 MMOL/L (ref 94–109)
CO2 SERPL-SCNC: 26 MMOL/L (ref 20–32)
COLOR UR AUTO: ABNORMAL
CREAT SERPL-MCNC: 0.9 MG/DL (ref 0.66–1.25)
DIFFERENTIAL METHOD BLD: ABNORMAL
EOSINOPHIL # BLD AUTO: 0.1 10E9/L (ref 0–0.7)
EOSINOPHIL NFR BLD AUTO: 0.4 %
ERYTHROCYTE [DISTWIDTH] IN BLOOD BY AUTOMATED COUNT: 12.2 % (ref 10–15)
GFR SERPL CREATININE-BSD FRML MDRD: >90 ML/MIN/{1.73_M2}
GLUCOSE BLDC GLUCOMTR-MCNC: 348 MG/DL (ref 70–99)
GLUCOSE BLDC GLUCOMTR-MCNC: 371 MG/DL (ref 70–99)
GLUCOSE SERPL-MCNC: 404 MG/DL (ref 70–99)
GLUCOSE UR STRIP-MCNC: >1000 MG/DL
HBA1C MFR BLD: 12.3 % (ref 0–5.6)
HCO3 BLDV-SCNC: 26 MMOL/L (ref 21–28)
HCT VFR BLD AUTO: 43 % (ref 40–53)
HGB BLD-MCNC: 14.2 G/DL (ref 13.3–17.7)
HGB UR QL STRIP: ABNORMAL
IMM GRANULOCYTES # BLD: 0.1 10E9/L (ref 0–0.4)
IMM GRANULOCYTES NFR BLD: 0.6 %
KETONES BLD-SCNC: 0.1 MMOL/L (ref 0–0.6)
KETONES UR STRIP-MCNC: NEGATIVE MG/DL
LACTATE BLD-SCNC: 1.7 MMOL/L (ref 0.7–2)
LEUKOCYTE ESTERASE UR QL STRIP: NEGATIVE
LYMPHOCYTES # BLD AUTO: 1.2 10E9/L (ref 0.8–5.3)
LYMPHOCYTES NFR BLD AUTO: 5.5 %
MCH RBC QN AUTO: 28.8 PG (ref 26.5–33)
MCHC RBC AUTO-ENTMCNC: 33 G/DL (ref 31.5–36.5)
MCV RBC AUTO: 87 FL (ref 78–100)
MONOCYTES # BLD AUTO: 1.1 10E9/L (ref 0–1.3)
MONOCYTES NFR BLD AUTO: 4.9 %
MUCOUS THREADS #/AREA URNS LPF: PRESENT /LPF
NEUTROPHILS # BLD AUTO: 19.8 10E9/L (ref 1.6–8.3)
NEUTROPHILS NFR BLD AUTO: 88.4 %
NITRATE UR QL: NEGATIVE
NRBC # BLD AUTO: 0 10*3/UL
NRBC BLD AUTO-RTO: 0 /100
O2/TOTAL GAS SETTING VFR VENT: NORMAL %
OXYHGB MFR BLDV: 47 %
PCO2 BLDV: 47 MM HG (ref 40–50)
PH BLDV: 7.36 PH (ref 7.32–7.43)
PH UR STRIP: 5.5 PH (ref 5–7)
PLATELET # BLD AUTO: 298 10E9/L (ref 150–450)
PO2 BLDV: 26 MM HG (ref 25–47)
POTASSIUM SERPL-SCNC: 4.5 MMOL/L (ref 3.4–5.3)
PROT SERPL-MCNC: 7.9 G/DL (ref 6.8–8.8)
RBC # BLD AUTO: 4.93 10E12/L (ref 4.4–5.9)
RBC #/AREA URNS AUTO: <1 /HPF (ref 0–2)
SARS-COV-2 PCR COMMENT: NORMAL
SARS-COV-2 RNA SPEC QL NAA+PROBE: NEGATIVE
SARS-COV-2 RNA SPEC QL NAA+PROBE: NORMAL
SODIUM SERPL-SCNC: 134 MMOL/L (ref 133–144)
SOURCE: ABNORMAL
SP GR UR STRIP: 1.03 (ref 1–1.03)
SPECIMEN SOURCE: NORMAL
SPECIMEN SOURCE: NORMAL
SQUAMOUS #/AREA URNS AUTO: <1 /HPF (ref 0–1)
UROBILINOGEN UR STRIP-MCNC: NORMAL MG/DL (ref 0–2)
WBC # BLD AUTO: 22.4 10E9/L (ref 4–11)
WBC #/AREA URNS AUTO: <1 /HPF (ref 0–5)

## 2020-05-25 PROCEDURE — U0003 INFECTIOUS AGENT DETECTION BY NUCLEIC ACID (DNA OR RNA); SEVERE ACUTE RESPIRATORY SYNDROME CORONAVIRUS 2 (SARS-COV-2) (CORONAVIRUS DISEASE [COVID-19]), AMPLIFIED PROBE TECHNIQUE, MAKING USE OF HIGH THROUGHPUT TECHNOLOGIES AS DESCRIBED BY CMS-2020-01-R: HCPCS | Performed by: EMERGENCY MEDICINE

## 2020-05-25 PROCEDURE — 99285 EMERGENCY DEPT VISIT HI MDM: CPT | Mod: 25

## 2020-05-25 PROCEDURE — 71045 X-RAY EXAM CHEST 1 VIEW: CPT

## 2020-05-25 PROCEDURE — 82010 KETONE BODYS QUAN: CPT | Performed by: EMERGENCY MEDICINE

## 2020-05-25 PROCEDURE — 93005 ELECTROCARDIOGRAM TRACING: CPT

## 2020-05-25 PROCEDURE — 87086 URINE CULTURE/COLONY COUNT: CPT | Performed by: EMERGENCY MEDICINE

## 2020-05-25 PROCEDURE — 83036 HEMOGLOBIN GLYCOSYLATED A1C: CPT | Performed by: EMERGENCY MEDICINE

## 2020-05-25 PROCEDURE — 96360 HYDRATION IV INFUSION INIT: CPT

## 2020-05-25 PROCEDURE — 80053 COMPREHEN METABOLIC PANEL: CPT | Performed by: EMERGENCY MEDICINE

## 2020-05-25 PROCEDURE — 25800030 ZZH RX IP 258 OP 636: Performed by: EMERGENCY MEDICINE

## 2020-05-25 PROCEDURE — 81001 URINALYSIS AUTO W/SCOPE: CPT | Performed by: EMERGENCY MEDICINE

## 2020-05-25 PROCEDURE — 87040 BLOOD CULTURE FOR BACTERIA: CPT | Performed by: EMERGENCY MEDICINE

## 2020-05-25 PROCEDURE — 82805 BLOOD GASES W/O2 SATURATION: CPT | Performed by: EMERGENCY MEDICINE

## 2020-05-25 PROCEDURE — 83605 ASSAY OF LACTIC ACID: CPT | Performed by: EMERGENCY MEDICINE

## 2020-05-25 PROCEDURE — 25000132 ZZH RX MED GY IP 250 OP 250 PS 637: Performed by: EMERGENCY MEDICINE

## 2020-05-25 PROCEDURE — 00000146 ZZHCL STATISTIC GLUCOSE BY METER IP

## 2020-05-25 PROCEDURE — 85025 COMPLETE CBC W/AUTO DIFF WBC: CPT | Performed by: EMERGENCY MEDICINE

## 2020-05-25 RX ORDER — ACETAMINOPHEN 500 MG
1000 TABLET ORAL ONCE
Status: COMPLETED | OUTPATIENT
Start: 2020-05-25 | End: 2020-05-25

## 2020-05-25 RX ADMIN — ACETAMINOPHEN 1000 MG: 500 TABLET, FILM COATED ORAL at 13:08

## 2020-05-25 RX ADMIN — SODIUM CHLORIDE 1000 ML: 9 INJECTION, SOLUTION INTRAVENOUS at 13:08

## 2020-05-25 RX ADMIN — SODIUM CHLORIDE 1000 ML: 9 INJECTION, SOLUTION INTRAVENOUS at 14:12

## 2020-05-25 ASSESSMENT — ENCOUNTER SYMPTOMS
HEADACHES: 1
DYSURIA: 0
DIARRHEA: 0
SHORTNESS OF BREATH: 0
SORE THROAT: 0
ABDOMINAL PAIN: 0
POLYDIPSIA: 1
NECK STIFFNESS: 0
FREQUENCY: 0
COUGH: 0
CHILLS: 1

## 2020-05-25 NOTE — ED AVS SNAPSHOT
[facility] Emergency Department  [address]  [facility] [address]  Phone:  [phone]  Fax:  [phone]                                    Kris Moeller Sr.   MRN: [id number]    Department:  [facility] Emergency Department   Date of Visit:  5/25/2020           After Visit Summary Signature Page    I have received my discharge instructions, and my questions have been answered. I have discussed any challenges I see with this plan with the nurse or doctor.    ..........................................................................................................................................  Patient/Patient Representative Signature      ..........................................................................................................................................  Patient Representative Print Name and Relationship to Patient    ..................................................               ................................................  Date                                   Time    ..........................................................................................................................................  Reviewed by Signature/Title    ...................................................              ..............................................  Date                                               Time          22EPIC Rev 08/18

## 2020-05-25 NOTE — ED PROVIDER NOTES
History     Chief Complaint:  Chills      HPI   Kris Moeller Sr. is a 45 year old year [place] speaking male who presents with chills.  He notes he awoke this morning and felt normal.  Around 10:30 AM suddenly he began to feel chills and a mild headache.  He took ibuprofen and his headache improved.  He denies any ongoing headache or neck stiffness.  He continued to feel chilled so he came to the emergency department.  He denies cough, sore throat, congestion, shortness of breath or known exposure to COVID-19.  He denies abdominal pain, dysuria or frequency, diarrhea or stool changes.  He notes he has been thirstier than normal.  He notes his blood sugars have been persistently elevated 400-500 recently.  Denies any changes with insulin.  Denies any recent travel outside the country.  He denies rashes.  He notes he had surgery months ago on his left leg but denies any pain or redness associated with that.    Allergies:  No Known Allergies     Medications:    aspirin 81 MG EC tablet  insulin aspart   insulin detemir   Metformin     Past Medical History:    Diabetes mellitus (H)   Hyperlipidemia   Hypertension   TIA (transient ischemic attack)     Past Surgical History:    Surgical history reviewed. No pertinent surgical history.     Family History:    Family history reviewed. No pertinent family history.      Social History:  Smoking Status: current   Smokeless Tobacco: Never Used  Alcohol Use: No  Drug Use: No  PCP: [facility], [place]     Review of Systems   Constitutional: Positive for chills.   HENT: Negative for sore throat.    Respiratory: Negative for cough and shortness of breath.    Gastrointestinal: Negative for abdominal pain and diarrhea.        No stool changes   Endocrine: Positive for polydipsia.   Genitourinary: Negative for dysuria and frequency.   Musculoskeletal: Negative for neck stiffness.   Skin: Negative for rash.   Neurological: Positive for headaches.   All other systems reviewed and are  negative.        Physical Exam     Patient Vitals for the past 24 hrs:   BP Temp Temp src Pulse Heart Rate Resp SpO2   05/25/20 1410 -- 99.2  F (37.3  C) Oral -- -- -- --   05/25/20 1345 138/76 -- -- 107 113 29 98 %   05/25/20 1330 (!) 158/79 -- -- 111 110 22 97 %   05/25/20 1231 (!) 142/88 102.7  F (39.3  C) Oral -- 125 18 100 %        Physical Exam  General: Adult male sitting upright  Eyes: PERRL, Conjunctive within normal limits. No scleral icterus.  ENT: Moist mucous membranes, oropharynx clear.   CV: Normal S1S2, no murmur, rub or gallop. Tachycardic, regular.  Resp: Clear to auscultation bilaterally, no wheezes, rales or rhonchi. Normal respiratory effort.  GI: Abdomen is soft, nontender and nondistended. No palpable masses. No rebound or guarding.  MSK: No edema. Nontender. Normal active range of motion.  Skin: Warm and dry. No rashes or lesions or ecchymoses on visible skin. Healing scabbed lesion over the left lateral leg with visible scar. Nontender. No erythema.  Neuro: Alert and oriented. Responds appropriately to all questions and commands. No focal findings appreciated. Normal muscle tone.  Psych: Normal mood and affect. Pleasant.    Emergency Department Course   ECG:  ECG taken at 1247  Sinus tachycardia  Otherwise normal ECG  Vent. rate 120 BPM  NJ interval 124 ms  QRS duration 70 ms  QT/QTc 292/412 ms  P-R-T axes 37 88 24    Imaging:  Radiology findings were communicated with the patient who voiced understanding of the findings.    XR Chest Port 1 View  Final Result  IMPRESSION: No acute disease.  AZUCENA KAM MD  Reading per radiology     Laboratory:  Laboratory findings were communicated with the patient who voiced understanding of the findings.    UA with micro: Urine GLC >1000 (A) Urine Blood small (A) Protein Albumin Urine 70 (A) Mucous urine present (A)  o/w wnl/negative       Urine culture: pending     CBC:  WBC 22.4 (H), HGB 14.2, , o/w WNL     CMP: Glucose 404 (H), o/w WNL  (Creatinine: 0.90)     Lactic Acid whole blood (1257): 1.7      blood gas venous and oxyhgb: pH: 7.36, PCO2: 47, PO2: 26, Bicarbonate: 26, Oxyhgb: 47    Blood Culture x2: Pending     Ketone beta-hydroxybutyrate quantitative: 0.1    Symptomatic COVID-19 Virus (Coronavirus) by PCR Nasopharyngeal swab: pending      Hemoglobin A1c: 12.3 (H)      Glucose by meter (1235): 371 (H)     Glucose by meter (1257): 404 (H)    Glucose by meter (1433): 348 (H)    Interventions:  1308 0.9% sodium chloride BOLUS 1000 mL IV   1308 Tylenol 1000 mg oral   1412 0.9% sodium chloride BOLUS 1000 mL IV     Emergency Department Course:  Past medical records, nursing notes, and vitals reviewed.    1243 I performed an exam of the patient as documented above.    The patient provided a urine sample here in the emergency department. This was sent for laboratory testing, findings above.     IV was inserted and blood was drawn for laboratory testing, results above.     The patient was sent for imaging while in the emergency department, results above.       1435 Patient rechecked and updated.  He notes he is feeling improved. He denies any new concerns. Discussed findings with him with the use of a [place] phone .     Findings and plan explained to the Patient. Patient discharged home with instructions regarding supportive care, medications, and reasons to return. The importance of close follow-up was reviewed.    Impression & Plan   Covid-19  Kris Moeller Sr. was evaluated during a global COVID-19 pandemic, which necessitated consideration that the patient might be at risk for infection with the SARS-CoV-2 virus that causes COVID-19.   Applicable protocols for evaluation were followed during the patient's care.   COVID-19 was considered as part of the patient's evaluation. The plan for testing is:  a test was obtained during this visit.    Medical Decision Making:  Kris Moeller Sr. is a 45 year old male who presents to the emergency department  today with fever in the setting of high blood sugar and chills.  Aside from tachycardia he is hemodynamically normal and otherwise well in appearance on assessment.  He has no symptoms.  He had noted a headache earlier but that is now gone.  Bacterial or viral meningitis seems unlikely in this clinical scenario.  His laboratory assessment is consistent with leukocytosis and hyperglycemia.  He is not ketotic or acidotic.  Blood cultures are sent and pending.  Chest x-ray not shown to pneumonia or COVID-like findings and urinalysis did not show evidence of UTI.  He has no skin rashes.  The postsurgical site from over a month ago does not show evidence of infection.  He may have COVID-19.  This test is pending.  He understands the need to home quarantine and that he will be contacted with the result.  His heart rate improved with IV fluid and fever control.  His blood sugar improved with IV fluid.  He is aware of the need to call his primary doctor tomorrow to be reassessed virtually and/or in person.  Also should have a discussion on insulin dosing.  As he is overall well in appearance I feel comfortable discharging him home.  He is comfortable this plan as well.  All questions answered prior to discharge.  He understands he should return with worsening symptoms.    Discharge Diagnosis:    ICD-10-CM    1. Fever of unknown origin  R50.9 Glucose by meter     Glucose by meter   2. Type 1 diabetes mellitus with hyperglycemia (H)  E10.65        Disposition:  Discharged to home.        Scribe Disclosure:  TOSHIA Serge Collins, am serving as a scribe at 12:43 PM on 5/25/2020 to document services personally performed by Maria Su MD based on my observations and the provider's statements to me.      5/25/2020   Maria Su MD Jonkman, Tracy Dianne, MD  05/25/20 [id number]

## 2020-05-25 NOTE — ED TRIAGE NOTES
Pt presents for concern of a high blood sugar and body aches. States symptoms began at approx 10:30. States he is feeling shaking. Denies cough or nausea. ABC intact, A&Ox4.

## 2020-05-26 LAB
BACTERIA SPEC CULT: NO GROWTH
INTERPRETATION ECG - MUSE: NORMAL
Lab: NORMAL
SPECIMEN SOURCE: NORMAL

## 2020-05-26 NOTE — RESULT ENCOUNTER NOTE
Final urine culture report is NEGATIVE per [facility] Result protocol.    If NEGATIVE result, no change in treatment, per [facility] Result protocol.

## 2020-05-31 LAB
BACTERIA SPEC CULT: NO GROWTH
BACTERIA SPEC CULT: NO GROWTH
Lab: NORMAL
Lab: NORMAL
SPECIMEN SOURCE: NORMAL
SPECIMEN SOURCE: NORMAL

## 2021-05-26 ENCOUNTER — APPOINTMENT (OUTPATIENT)
Dept: GENERAL RADIOLOGY | Facility: CLINIC | Age: 46
End: 2021-05-26
Attending: PHYSICIAN ASSISTANT
Payer: MEDICAID

## 2021-05-26 ENCOUNTER — HOSPITAL ENCOUNTER (EMERGENCY)
Facility: CLINIC | Age: 46
Discharge: HOME | End: 2021-05-26
Attending: PHYSICIAN ASSISTANT | Admitting: PHYSICIAN ASSISTANT
Payer: MEDICAID

## 2021-05-26 VITALS
TEMPERATURE: 98.3 F | SYSTOLIC BLOOD PRESSURE: 148 MMHG | BODY MASS INDEX: 30.79 KG/M2 | DIASTOLIC BLOOD PRESSURE: 90 MMHG | OXYGEN SATURATION: 100 % | WEIGHT: 185 LBS | RESPIRATION RATE: 14 BRPM | HEART RATE: 82 BPM

## 2021-05-26 DIAGNOSIS — R73.9 HYPERGLYCEMIA: ICD-10-CM

## 2021-05-26 DIAGNOSIS — L97.509 DIABETIC FOOT ULCER (H): ICD-10-CM

## 2021-05-26 DIAGNOSIS — E11.621 DIABETIC FOOT ULCER (H): ICD-10-CM

## 2021-05-26 LAB
ANION GAP SERPL CALCULATED.3IONS-SCNC: 5 MMOL/L (ref 3–14)
BASOPHILS # BLD AUTO: 0 10E9/L (ref 0–0.2)
BASOPHILS NFR BLD AUTO: 0.1 %
BUN SERPL-MCNC: 26 MG/DL (ref 7–30)
CALCIUM SERPL-MCNC: 8.4 MG/DL (ref 8.5–10.1)
CHLORIDE SERPL-SCNC: 103 MMOL/L (ref 94–109)
CO2 SERPL-SCNC: 27 MMOL/L (ref 20–32)
CREAT SERPL-MCNC: 1.11 MG/DL (ref 0.66–1.25)
DIFFERENTIAL METHOD BLD: ABNORMAL
EOSINOPHIL # BLD AUTO: 0.2 10E9/L (ref 0–0.7)
EOSINOPHIL NFR BLD AUTO: 2 %
ERYTHROCYTE [DISTWIDTH] IN BLOOD BY AUTOMATED COUNT: 12 % (ref 10–15)
GFR SERPL CREATININE-BSD FRML MDRD: 79 ML/MIN/{1.73_M2}
GLUCOSE SERPL-MCNC: 492 MG/DL (ref 70–99)
HCT VFR BLD AUTO: 39.7 % (ref 40–53)
HGB BLD-MCNC: 13.3 G/DL (ref 13.3–17.7)
IMM GRANULOCYTES # BLD: 0 10E9/L (ref 0–0.4)
IMM GRANULOCYTES NFR BLD: 0.3 %
LACTATE BLD-SCNC: 1.6 MMOL/L (ref 0.7–2)
LYMPHOCYTES # BLD AUTO: 2.3 10E9/L (ref 0.8–5.3)
LYMPHOCYTES NFR BLD AUTO: 30.3 %
MCH RBC QN AUTO: 28.9 PG (ref 26.5–33)
MCHC RBC AUTO-ENTMCNC: 33.5 G/DL (ref 31.5–36.5)
MCV RBC AUTO: 86 FL (ref 78–100)
MONOCYTES # BLD AUTO: 0.5 10E9/L (ref 0–1.3)
MONOCYTES NFR BLD AUTO: 6 %
NEUTROPHILS # BLD AUTO: 4.6 10E9/L (ref 1.6–8.3)
NEUTROPHILS NFR BLD AUTO: 61.3 %
NRBC # BLD AUTO: 0 10*3/UL
NRBC BLD AUTO-RTO: 0 /100
PLATELET # BLD AUTO: 270 10E9/L (ref 150–450)
POTASSIUM SERPL-SCNC: 4.5 MMOL/L (ref 3.4–5.3)
RBC # BLD AUTO: 4.6 10E12/L (ref 4.4–5.9)
SODIUM SERPL-SCNC: 135 MMOL/L (ref 133–144)
WBC # BLD AUTO: 7.5 10E9/L (ref 4–11)

## 2021-05-26 PROCEDURE — 99284 EMERGENCY DEPT VISIT MOD MDM: CPT

## 2021-05-26 PROCEDURE — 85025 COMPLETE CBC W/AUTO DIFF WBC: CPT | Performed by: PHYSICIAN ASSISTANT

## 2021-05-26 PROCEDURE — 83605 ASSAY OF LACTIC ACID: CPT | Performed by: PHYSICIAN ASSISTANT

## 2021-05-26 PROCEDURE — 73630 X-RAY EXAM OF FOOT: CPT | Mod: RT

## 2021-05-26 PROCEDURE — 80048 BASIC METABOLIC PNL TOTAL CA: CPT | Performed by: PHYSICIAN ASSISTANT

## 2021-05-26 RX ORDER — CEPHALEXIN 500 MG/1
500 CAPSULE ORAL 4 TIMES DAILY
Qty: 28 CAPSULE | Refills: 0 | Status: SHIPPED | OUTPATIENT
Start: 2021-05-26 | End: 2021-05-26

## 2021-05-26 RX ORDER — CEPHALEXIN 500 MG/1
500 CAPSULE ORAL 4 TIMES DAILY
Qty: 40 CAPSULE | Refills: 0 | Status: SHIPPED | OUTPATIENT
Start: 2021-05-26 | End: 2021-06-05

## 2021-05-26 RX ORDER — DOXYCYCLINE HYCLATE 100 MG
100 TABLET ORAL 2 TIMES DAILY
Qty: 14 TABLET | Refills: 0 | Status: SHIPPED | OUTPATIENT
Start: 2021-05-26 | End: 2021-05-26

## 2021-05-26 RX ORDER — DOXYCYCLINE HYCLATE 100 MG
100 TABLET ORAL 2 TIMES DAILY
Qty: 20 TABLET | Refills: 0 | Status: SHIPPED | OUTPATIENT
Start: 2021-05-26 | End: 2021-06-05

## 2021-05-26 ASSESSMENT — ENCOUNTER SYMPTOMS
COLOR CHANGE: 1
WOUND: 1

## 2021-05-26 NOTE — ED TRIAGE NOTES
Right great toe and second toe red, swollen and painful with areas that are black. Patient states he is diabetic and they have been like this for 2 weeks but worse today. ABC intact alert and no distress.

## 2021-05-26 NOTE — ED PROVIDER NOTES
History   Chief Complaint:  Foot Pain and Cellulitis       HPI   Kris Moeller Sr. is a 46 year old male with history of type II diabetes who presents with foot pain and cellulitis. The patient's family member reports that his second right toe has been red, swollen, and painful for the last two weeks. There is also a large black area under the nail on his left great toe. Symptoms were worse today, prompting his presentation. Pain is rated at a 3/10. The patient is diabetic and denies any trauma to the foot. The patient also denies fever.     Review of Systems   Skin: Positive for color change and wound.   All other systems reviewed and are negative.      Allergies:  No known allergies    Medications:  Novolog  Levemir  Metformin  Oxycodone  Senna-docusate    Past Medical History:    Diabetes  Hyperlipidemia  Hypertension  TIA  Thigh abscess  Cellulitis  Tobacco abuse     Past Surgical History:    Incision and drainage lower extremity  Irrigation and debridement knee    Family History:    Diabetes, father  Diabetes, mother  Hypertension, mother  High cholesterol, brother  Diabetes, brother  Diabetes, sister    Social History:  Accompanied by a family member  Arrives via car    Physical Exam     Patient Vitals for the past 24 hrs:   BP Temp Temp src Pulse Resp SpO2 Weight   05/26/21 1954 -- -- -- -- -- -- 83.9 kg (185 lb)   05/26/21 1945 -- -- -- -- -- 100 % --   05/26/21 1930 (!) 151/91 -- -- 82 -- 100 % --   05/26/21 1758 (!) 143/86 98.3  F (36.8  C) Temporal 103 20 100 % --       Physical Exam        Constitutional: alert, cooperative   CV:  2+ DP pulse, brisk distal cap refill  Pulm: No respiratory distress  MSK: 1 cm eschar to the right great toenail bed, no surrounding erythema. Superficial ulcer to the 2nd toe, dorsal aspect with surrounding erythema to the dorsal toe. Minimal tenderness (decrease sensation due to diabetic neuropathy)   Neurological: Alert, attentive. Decrease sensation bilateral feet (diabetic  neuropathy). Able to wiggle all 5 toes.   Skin: Skin is warm and dry. See MSK exam.   Psych: Normal mood and affect   Emergency Department Course     Imaging:    XR Foot Right G/E 3 Views  No cortical erosion to suggest osteomyelitis. No fractures are evident. Normal joint spacing.    Read per radiology    Laboratory:    CBC: WBC 7.5, HGB 13.3,   BMP: glucose 492 (H), calcium 8.4 (L) o/w WNL (Creatinine 1.11)     Lactic acid (result time 1928) 1.6     Emergency Department Course:    Reviewed:  I reviewed nursing notes, vitals, past medical history and care everywhere    Assessments:  1849 I obtained history and examined the patient as noted above.   2028 I rechecked the patient and explained findings.     Disposition:  The patient was discharged to home.      Impression & Plan     Medical Decision Making:  Kris Moeller Sr. is a 46 year old male with a medical history including type 2 diabetes mellitus presents emergency department with right foot ulcer as detailed above.  Erythema is localized to the second toe, no spreading redness to the foot.  There is no leukocytosis or lactic acidosis.  Evidence of hyperglycemia, no evidence of DKA.  Vitals reassuring without evidence of tachycardia or fever.  X-ray with no evidence of osteomyelitis.  At this time, I believe patient can be safely discharged home with oral antibiotics.  Plan to follow-up closely with podiatry, referral provided.  Return precautions discussed including spreading redness, fevers, increasing pain, or other concerning symptoms develop.  Prescribed Keflex and doxycycline to cover for possible MRSA infection.  Patient agrees with this plan all questions and concerns addressed prior to discharge home.    Diagnosis:    ICD-10-CM    1. Diabetic foot ulcer (H)  E11.621     L97.509    2. Hyperglycemia  R73.9        Discharge Medications:  Current Discharge Medication List      START taking these medications    Details   cephALEXin (KEFLEX) 500 MG  capsule Take 1 capsule (500 mg) by mouth 4 times daily for 10 days  Qty: 40 capsule, Refills: 0      doxycycline hyclate (VIBRA-TABS) 100 MG tablet Take 1 tablet (100 mg) by mouth 2 times daily for 10 days  Qty: 20 tablet, Refills: 0             Scribe Disclosure:  Pierre POPE, am serving as a scribe at 6:49 PM on 5/26/2021 to document services personally performed by Unique Sanchez PA-C based on my observations and the provider's statements to me.            Unique Sanchez PA-C  05/27/21 0101

## 2021-05-27 NOTE — DISCHARGE INSTRUCTIONS
*Elevate your leg as much as possible.  *Take medications as prescribed.    *Follow-up with podiatry for a recheck in the next 24-48 hours.  *Return if you develop fever, rapid spread or pain/redness/warmth, worsening pain, faint or feel like you will faint or become worse in any way.

## 2023-12-23 NOTE — ANESTHESIA POSTPROCEDURE EVALUATION
Patient: Kris Moeller Sr.    Procedure(s):  IRRIGATION AND DEBRIDEMENT, left leg with closure    Diagnosis:Wound infection [T14.8XXA, L08.9]  Diagnosis Additional Information: No value filed.    Anesthesia Type:  General    Note:  Anesthesia Post Evaluation    Patient location during evaluation: PACU  Patient participation: Able to fully participate in evaluation  Level of consciousness: awake and alert  Pain management: adequate  multimodal analgesia used between 6 hours prior to anesthesia start to PACU dischargeAirway patency: patent  Cardiovascular status: acceptable  Respiratory status: acceptable  two or more mitigation strategies used for obstructive sleep apneaHydration status: acceptable  PONV: none     Anesthetic complications: None          Last vitals:  Vitals:    04/11/20 1115 04/11/20 1120 04/11/20 1130   BP: (!) 179/100 (!) 169/93 (!) 150/84   Pulse: 75     Resp: 10 11 10   Temp:      SpO2: 100% 99% 100%         Electronically Signed By: Chun Kaur MD  April 11, 2020  11:38 AM  
no

## (undated) DEVICE — GLOVE PROTEXIS POWDER FREE SMT 7.5  2D72PT75X

## (undated) DEVICE — GLOVE PROTEXIS W/NEU-THERA 7.0  2D73TE70

## (undated) DEVICE — PREP SCRUB SOL EXIDINE 4% CHG 4OZ 29002-404

## (undated) DEVICE — NDL 18GA 1.5" 305196

## (undated) DEVICE — DRAPE LAP PEDS DISP 29492

## (undated) DEVICE — GLOVE PROTEXIS POWDER FREE 8.0 ORTHOPEDIC 2D73ET80

## (undated) DEVICE — KIT MINITIP UVT 1ML FLOCKED FLEX 220526

## (undated) DEVICE — TUBING SUCTION 12"X1/4" N612

## (undated) DEVICE — CAST PADDING 6" STERILE 9046S

## (undated) DEVICE — Device

## (undated) DEVICE — PREP POVIDONE IODINE SOLUTION 10% 4OZ

## (undated) DEVICE — LINEN FULL SHEET 5511

## (undated) DEVICE — DRSG TELFA 3X8" 1238

## (undated) DEVICE — GOWN XLG DISP 9545

## (undated) DEVICE — GLOVE PROTEXIS POWDER FREE 8.5 ORTHOPEDIC 2D73ET85

## (undated) DEVICE — GLOVE PROTEXIS BLUE W/NEU-THERA 8.5  2D73EB85

## (undated) DEVICE — PREP CHLORAPREP 26ML TINTED ORANGE  260815

## (undated) DEVICE — KIT CULTURE ESWAB AEROBE/ANAEROBE WHITE TOP R723480

## (undated) DEVICE — CANISTER WOUND VAC W/GEL 500ML M8275063/5

## (undated) DEVICE — SYR EAR BULB 3OZ 0035830

## (undated) DEVICE — PACK LOWER EXTREMITY RIDGES

## (undated) DEVICE — BAG CLEAR TRASH 1.3M 39X33" P4040C

## (undated) DEVICE — DRAPE STOCKINETTE IMPERVIOUS 12" 1587

## (undated) DEVICE — SU VICRYL 2-0 CT-2 27" UND J269H

## (undated) DEVICE — SOL NACL 0.9% IRRIG 1000ML BOTTLE 2F7124

## (undated) DEVICE — SET HANDPIECE INTERPULSE W/COAXIAL FAN SPRAY TIP 0210118000

## (undated) DEVICE — TOURNIQUET CUFF 30" REPRO BLUE 60-7070-105

## (undated) DEVICE — SU ETHILON 3-0 PS-2 18" 1669H

## (undated) DEVICE — GLOVE PROTEXIS BLUE W/NEU-THERA 7.5  2D73EB75

## (undated) DEVICE — PREP SKIN SCRUB TRAY 4461A

## (undated) DEVICE — GLOVE PROTEXIS POWDER FREE 7.0 ORTHOPEDIC 2D73ET70

## (undated) DEVICE — GLOVE PROTEXIS W/NEU-THERA 8.5  2D73TE85

## (undated) DEVICE — LINEN HALF SHEET 5512

## (undated) DEVICE — DRSG ABDOMINAL 07 1/2X8" 7197D

## (undated) DEVICE — TUBE CULTURE AEROBIC/ANAEROBIC W/O SWABS A.C.T.I.1. 12401

## (undated) DEVICE — SPECIMEN CULTURETTE DBL SWAB 220109

## (undated) DEVICE — SUCTION MANIFOLD NEPTUNE 2 SYS 4 PORT 0702-020-000

## (undated) DEVICE — GLOVE PROTEXIS BLUE W/NEU-THERA 7.0  2D73EB70

## (undated) DEVICE — ESU GROUND PAD ADULT W/CORD E7507

## (undated) DEVICE — BNDG ELASTIC 6" DBL LENGTH UNSTERILE 6611-16

## (undated) DEVICE — GLOVE PROTEXIS POWDER FREE 7.5 ORTHOPEDIC 2D73ET75

## (undated) DEVICE — GLOVE PROTEXIS POWDER FREE SMT 8.5 2D72PT85X

## (undated) DEVICE — SYR BULB IRRIG 50ML LATEX FREE 0035280

## (undated) DEVICE — SPONGE LAP 18X18" X8435

## (undated) DEVICE — BNDG COBAN 6"X5YDS STERILE

## (undated) RX ORDER — CEFAZOLIN SODIUM 2 G/100ML
INJECTION, SOLUTION INTRAVENOUS
Status: DISPENSED
Start: 2020-04-11

## (undated) RX ORDER — HYDROMORPHONE HYDROCHLORIDE 1 MG/ML
INJECTION, SOLUTION INTRAMUSCULAR; INTRAVENOUS; SUBCUTANEOUS
Status: DISPENSED
Start: 2020-04-09

## (undated) RX ORDER — LIDOCAINE HYDROCHLORIDE 10 MG/ML
INJECTION, SOLUTION EPIDURAL; INFILTRATION; INTRACAUDAL; PERINEURAL
Status: DISPENSED
Start: 2020-04-11

## (undated) RX ORDER — LABETALOL 20 MG/4 ML (5 MG/ML) INTRAVENOUS SYRINGE
Status: DISPENSED
Start: 2020-04-11

## (undated) RX ORDER — ONDANSETRON 2 MG/ML
INJECTION INTRAMUSCULAR; INTRAVENOUS
Status: DISPENSED
Start: 2020-04-09

## (undated) RX ORDER — CEFAZOLIN SODIUM 2 G/100ML
INJECTION, SOLUTION INTRAVENOUS
Status: DISPENSED
Start: 2020-04-09

## (undated) RX ORDER — FENTANYL CITRATE 50 UG/ML
INJECTION, SOLUTION INTRAMUSCULAR; INTRAVENOUS
Status: DISPENSED
Start: 2020-04-11

## (undated) RX ORDER — PROPOFOL 10 MG/ML
INJECTION, EMULSION INTRAVENOUS
Status: DISPENSED
Start: 2020-04-11

## (undated) RX ORDER — LIDOCAINE HYDROCHLORIDE 10 MG/ML
INJECTION, SOLUTION EPIDURAL; INFILTRATION; INTRACAUDAL; PERINEURAL
Status: DISPENSED
Start: 2020-04-09

## (undated) RX ORDER — DEXAMETHASONE SODIUM PHOSPHATE 4 MG/ML
INJECTION, SOLUTION INTRA-ARTICULAR; INTRALESIONAL; INTRAMUSCULAR; INTRAVENOUS; SOFT TISSUE
Status: DISPENSED
Start: 2020-04-11

## (undated) RX ORDER — PROPOFOL 10 MG/ML
INJECTION, EMULSION INTRAVENOUS
Status: DISPENSED
Start: 2020-04-09

## (undated) RX ORDER — EPHEDRINE SULFATE 50 MG/ML
INJECTION, SOLUTION INTRAMUSCULAR; INTRAVENOUS; SUBCUTANEOUS
Status: DISPENSED
Start: 2020-04-11

## (undated) RX ORDER — FENTANYL CITRATE 50 UG/ML
INJECTION, SOLUTION INTRAMUSCULAR; INTRAVENOUS
Status: DISPENSED
Start: 2020-04-09

## (undated) RX ORDER — DEXAMETHASONE SODIUM PHOSPHATE 4 MG/ML
INJECTION, SOLUTION INTRA-ARTICULAR; INTRALESIONAL; INTRAMUSCULAR; INTRAVENOUS; SOFT TISSUE
Status: DISPENSED
Start: 2020-04-09

## (undated) RX ORDER — ONDANSETRON 2 MG/ML
INJECTION INTRAMUSCULAR; INTRAVENOUS
Status: DISPENSED
Start: 2020-04-11

## (undated) RX ORDER — GLYCOPYRROLATE 0.2 MG/ML
INJECTION INTRAMUSCULAR; INTRAVENOUS
Status: DISPENSED
Start: 2020-04-11